# Patient Record
Sex: FEMALE | Race: WHITE | NOT HISPANIC OR LATINO | Employment: FULL TIME | URBAN - METROPOLITAN AREA
[De-identification: names, ages, dates, MRNs, and addresses within clinical notes are randomized per-mention and may not be internally consistent; named-entity substitution may affect disease eponyms.]

---

## 2018-07-03 ENCOUNTER — OFFICE VISIT (OUTPATIENT)
Dept: MEDICAL GROUP | Facility: PHYSICIAN GROUP | Age: 33
End: 2018-07-03
Payer: COMMERCIAL

## 2018-07-03 VITALS
TEMPERATURE: 97.6 F | HEIGHT: 67 IN | BODY MASS INDEX: 35.31 KG/M2 | WEIGHT: 225 LBS | HEART RATE: 91 BPM | OXYGEN SATURATION: 95 % | RESPIRATION RATE: 12 BRPM | DIASTOLIC BLOOD PRESSURE: 70 MMHG | SYSTOLIC BLOOD PRESSURE: 102 MMHG

## 2018-07-03 DIAGNOSIS — E66.9 OBESITY (BMI 30-39.9): ICD-10-CM

## 2018-07-03 DIAGNOSIS — Z91.018 CLASSIC IGE MEDIATED FOOD ALLERGY: ICD-10-CM

## 2018-07-03 DIAGNOSIS — Z79.891 CHRONIC PRESCRIPTION OPIATE USE: ICD-10-CM

## 2018-07-03 DIAGNOSIS — R63.5 WEIGHT GAIN: ICD-10-CM

## 2018-07-03 DIAGNOSIS — M21.611 BUNION OF GREAT TOE OF RIGHT FOOT: ICD-10-CM

## 2018-07-03 DIAGNOSIS — L70.9 ADULT ACNE: ICD-10-CM

## 2018-07-03 PROCEDURE — 99204 OFFICE O/P NEW MOD 45 MIN: CPT | Performed by: FAMILY MEDICINE

## 2018-07-03 RX ORDER — BUPRENORPHINE HYDROCHLORIDE AND NALOXONE HYDROCHLORIDE DIHYDRATE 8; 2 MG/1; MG/1
TABLET SUBLINGUAL
Refills: 1 | COMMUNITY
Start: 2018-06-10

## 2018-07-03 RX ORDER — ADAPALENE 0.1 G/100G
1 CREAM TOPICAL DAILY
Qty: 1 TUBE | Refills: 6 | Status: SHIPPED | OUTPATIENT
Start: 2018-07-03 | End: 2019-08-01

## 2018-07-03 ASSESSMENT — ENCOUNTER SYMPTOMS
DEPRESSION: 0
CHILLS: 0
MYALGIAS: 0
DIZZINESS: 0
PALPITATIONS: 0
PSYCHIATRIC NEGATIVE: 1
TINGLING: 0
BACK PAIN: 1
FEVER: 0
DOUBLE VISION: 0
BLURRED VISION: 0
GASTROINTESTINAL NEGATIVE: 1
COUGH: 0
HEADACHES: 0
BRUISES/BLEEDS EASILY: 0
NEUROLOGICAL NEGATIVE: 1
HEARTBURN: 0
EYES NEGATIVE: 1
NAUSEA: 0
CARDIOVASCULAR NEGATIVE: 1
RESPIRATORY NEGATIVE: 1
HEMOPTYSIS: 0

## 2018-07-03 ASSESSMENT — PATIENT HEALTH QUESTIONNAIRE - PHQ9: CLINICAL INTERPRETATION OF PHQ2 SCORE: 0

## 2018-07-03 NOTE — PROGRESS NOTES
Subjective:      Constance Mccullough is a 33 y.o. female who presents with Weight Loss (wants to loss weight ); Fatigue; Acne; and Toe Injury (feels numb)            New patient visit - issues as listed below  The weight gain and fatigue may be related to her suboxone use, she started this as a change from other opiate rx and is working with her prescriber to taper down    1. Bunion of great toe of right foot  Pain with walking and numb at times  - buprenorphine-naloxone (SUBOXONE) 8-2 MG SL Tab; DISSOVE 1 TABLET UNDER THE TONGUE TWICE DAILY SUBLINGUAL 30 DAYS; Refill: 1  - Patient identified as having weight management issue.  Appropriate orders and counseling given.  - REFERRAL TO PODIATRY  - adapalene (DIFFERIN) 0.1 % cream; Apply 1 g to affected area(s) every day.  Dispense: 1 Tube; Refill: 6  - REFERRAL TO DERMATOLOGY  - COMP METABOLIC PANEL; Future  - FREE THYROXINE; Future  - LIPID PROFILE; Future  - TRIIDOTHYRONINE; Future  - TSH; Future  - VITAMIN D,25 HYDROXY; Future  - CBC WITHOUT DIFFERENTIAL; Future    2. Weight gain    - buprenorphine-naloxone (SUBOXONE) 8-2 MG SL Tab; DISSOVE 1 TABLET UNDER THE TONGUE TWICE DAILY SUBLINGUAL 30 DAYS; Refill: 1  - Patient identified as having weight management issue.  Appropriate orders and counseling given.  - REFERRAL TO PODIATRY  - adapalene (DIFFERIN) 0.1 % cream; Apply 1 g to affected area(s) every day.  Dispense: 1 Tube; Refill: 6  - REFERRAL TO DERMATOLOGY  - COMP METABOLIC PANEL; Future  - FREE THYROXINE; Future  - LIPID PROFILE; Future  - TRIIDOTHYRONINE; Future  - TSH; Future  - VITAMIN D,25 HYDROXY; Future  - CBC WITHOUT DIFFERENTIAL; Future    3. Adult acne  For past 6 mo  - buprenorphine-naloxone (SUBOXONE) 8-2 MG SL Tab; DISSOVE 1 TABLET UNDER THE TONGUE TWICE DAILY SUBLINGUAL 30 DAYS; Refill: 1  - Patient identified as having weight management issue.  Appropriate orders and counseling given.  - REFERRAL TO PODIATRY  - adapalene (DIFFERIN) 0.1 % cream; Apply 1  g to affected area(s) every day.  Dispense: 1 Tube; Refill: 6  - REFERRAL TO DERMATOLOGY  - COMP METABOLIC PANEL; Future  - FREE THYROXINE; Future  - LIPID PROFILE; Future  - TRIIDOTHYRONINE; Future  - TSH; Future  - VITAMIN D,25 HYDROXY; Future  - CBC WITHOUT DIFFERENTIAL; Future    4. Chronic prescription opiate use    - buprenorphine-naloxone (SUBOXONE) 8-2 MG SL Tab; DISSOVE 1 TABLET UNDER THE TONGUE TWICE DAILY SUBLINGUAL 30 DAYS; Refill: 1  - Patient identified as having weight management issue.  Appropriate orders and counseling given.  - REFERRAL TO PODIATRY  - adapalene (DIFFERIN) 0.1 % cream; Apply 1 g to affected area(s) every day.  Dispense: 1 Tube; Refill: 6  - REFERRAL TO DERMATOLOGY  - COMP METABOLIC PANEL; Future  - FREE THYROXINE; Future  - LIPID PROFILE; Future  - TRIIDOTHYRONINE; Future  - TSH; Future  - VITAMIN D,25 HYDROXY; Future  - CBC WITHOUT DIFFERENTIAL; Future    5. Obesity (BMI 30-39.9)    - buprenorphine-naloxone (SUBOXONE) 8-2 MG SL Tab; DISSOVE 1 TABLET UNDER THE TONGUE TWICE DAILY SUBLINGUAL 30 DAYS; Refill: 1  - Patient identified as having weight management issue.  Appropriate orders and counseling given.  - REFERRAL TO PODIATRY  - adapalene (DIFFERIN) 0.1 % cream; Apply 1 g to affected area(s) every day.  Dispense: 1 Tube; Refill: 6  - REFERRAL TO DERMATOLOGY  - COMP METABOLIC PANEL; Future  - FREE THYROXINE; Future  - LIPID PROFILE; Future  - TRIIDOTHYRONINE; Future  - TSH; Future  - VITAMIN D,25 HYDROXY; Future  - CBC WITHOUT DIFFERENTIAL; Future    6. Classic IgE mediated food allergy  Broke out in hives and had a rash when eating a banana lately, would like to get allergy testing  - REFERRAL TO ALLERGY    Past Medical History:  No date: C section  No date: Chronic prescription opiate use  Past Surgical History:  No date: CHOLECYSTECTOMY      Comment: avelino  No date: CYST EXCISION  No date: PB C-SEC ONLY,PBEV C-SEC      Comment:  x 3  Smoking status: Never Smoker         "                                                      Smokeless tobacco: Never Used                      Alcohol use: No              Review of patient's family history indicates:    Cancer                         Mother                    Psychiatry                     Father                      Current Outpatient Prescriptions: •  adapalene (DIFFERIN) 0.1 % cream, Apply 1 g to affected area(s) every day., Disp: 1 Tube, Rfl: 6•  buprenorphine-naloxone (SUBOXONE) 8-2 MG SL Tab, DISSOVE 1 TABLET UNDER THE TONGUE TWICE DAILY SUBLINGUAL 30 DAYS, Disp: , Rfl: 1•  meclizine (ANTIVERT) 25 MG TABS, Take 1 Tab by mouth 3 times a day as needed., Disp: 20 Each, Rfl: 0    Patient was instructed on the use of medications, either prescriptions or OTC and informed on when the appropriate follow up time period should be. In addition, patient was also instructed that should any acute worsening occur that they should notify this clinic asap or call 911.            Review of Systems   Constitutional: Positive for malaise/fatigue. Negative for chills and fever.   HENT: Negative.  Negative for hearing loss.    Eyes: Negative.  Negative for blurred vision and double vision.   Respiratory: Negative.  Negative for cough and hemoptysis.    Cardiovascular: Negative.  Negative for chest pain and palpitations.   Gastrointestinal: Negative.  Negative for heartburn and nausea.   Genitourinary: Negative.  Negative for dysuria.   Musculoskeletal: Positive for back pain and joint pain. Negative for myalgias.   Skin: Positive for rash.   Neurological: Negative.  Negative for dizziness, tingling and headaches.   Endo/Heme/Allergies: Negative.  Does not bruise/bleed easily.   Psychiatric/Behavioral: Negative.  Negative for depression and suicidal ideas.   All other systems reviewed and are negative.         Objective:     /70   Pulse 91   Temp 36.4 °C (97.6 °F)   Resp 12   Ht 1.689 m (5' 6.5\")   Wt 102.1 kg (225 lb)   SpO2 95%   BMI " 35.77 kg/m²      Physical Exam   Constitutional: She is oriented to person, place, and time. She appears well-developed and well-nourished. No distress.   HENT:   Head: Normocephalic and atraumatic.   Mouth/Throat: Oropharynx is clear and moist. No oropharyngeal exudate.   Eyes: Pupils are equal, round, and reactive to light.   Cardiovascular: Normal rate, regular rhythm, normal heart sounds and intact distal pulses.  Exam reveals no gallop and no friction rub.    No murmur heard.  Pulmonary/Chest: Effort normal and breath sounds normal. No respiratory distress. She has no wheezes. She has no rales. She exhibits no tenderness.   Musculoskeletal:        Feet:    Bunion on right great toe   Neurological: She is alert and oriented to person, place, and time.   Skin: She is not diaphoretic. There is erythema.        Pustules and scabbing on face   Psychiatric: She has a normal mood and affect. Her behavior is normal. Judgment and thought content normal.   Nursing note and vitals reviewed.              Assessment/Plan:     1. Bunion of great toe of right foot    - buprenorphine-naloxone (SUBOXONE) 8-2 MG SL Tab; DISSOVE 1 TABLET UNDER THE TONGUE TWICE DAILY SUBLINGUAL 30 DAYS; Refill: 1  - Patient identified as having weight management issue.  Appropriate orders and counseling given.  - REFERRAL TO PODIATRY  - adapalene (DIFFERIN) 0.1 % cream; Apply 1 g to affected area(s) every day.  Dispense: 1 Tube; Refill: 6  - REFERRAL TO DERMATOLOGY  - COMP METABOLIC PANEL; Future  - FREE THYROXINE; Future  - LIPID PROFILE; Future  - TRIIDOTHYRONINE; Future  - TSH; Future  - VITAMIN D,25 HYDROXY; Future  - CBC WITHOUT DIFFERENTIAL; Future    2. Weight gain    - buprenorphine-naloxone (SUBOXONE) 8-2 MG SL Tab; DISSOVE 1 TABLET UNDER THE TONGUE TWICE DAILY SUBLINGUAL 30 DAYS; Refill: 1  - Patient identified as having weight management issue.  Appropriate orders and counseling given.  - REFERRAL TO PODIATRY  - adapalene (DIFFERIN) 0.1  % cream; Apply 1 g to affected area(s) every day.  Dispense: 1 Tube; Refill: 6  - REFERRAL TO DERMATOLOGY  - COMP METABOLIC PANEL; Future  - FREE THYROXINE; Future  - LIPID PROFILE; Future  - TRIIDOTHYRONINE; Future  - TSH; Future  - VITAMIN D,25 HYDROXY; Future  - CBC WITHOUT DIFFERENTIAL; Future    3. Adult acne    - buprenorphine-naloxone (SUBOXONE) 8-2 MG SL Tab; DISSOVE 1 TABLET UNDER THE TONGUE TWICE DAILY SUBLINGUAL 30 DAYS; Refill: 1  - Patient identified as having weight management issue.  Appropriate orders and counseling given.  - REFERRAL TO PODIATRY  - adapalene (DIFFERIN) 0.1 % cream; Apply 1 g to affected area(s) every day.  Dispense: 1 Tube; Refill: 6  - REFERRAL TO DERMATOLOGY  - COMP METABOLIC PANEL; Future  - FREE THYROXINE; Future  - LIPID PROFILE; Future  - TRIIDOTHYRONINE; Future  - TSH; Future  - VITAMIN D,25 HYDROXY; Future  - CBC WITHOUT DIFFERENTIAL; Future    4. Chronic prescription opiate use    - buprenorphine-naloxone (SUBOXONE) 8-2 MG SL Tab; DISSOVE 1 TABLET UNDER THE TONGUE TWICE DAILY SUBLINGUAL 30 DAYS; Refill: 1  - Patient identified as having weight management issue.  Appropriate orders and counseling given.  - REFERRAL TO PODIATRY  - adapalene (DIFFERIN) 0.1 % cream; Apply 1 g to affected area(s) every day.  Dispense: 1 Tube; Refill: 6  - REFERRAL TO DERMATOLOGY  - COMP METABOLIC PANEL; Future  - FREE THYROXINE; Future  - LIPID PROFILE; Future  - TRIIDOTHYRONINE; Future  - TSH; Future  - VITAMIN D,25 HYDROXY; Future  - CBC WITHOUT DIFFERENTIAL; Future    5. Obesity (BMI 30-39.9)    - buprenorphine-naloxone (SUBOXONE) 8-2 MG SL Tab; DISSOVE 1 TABLET UNDER THE TONGUE TWICE DAILY SUBLINGUAL 30 DAYS; Refill: 1  - Patient identified as having weight management issue.  Appropriate orders and counseling given.  - REFERRAL TO PODIATRY  - adapalene (DIFFERIN) 0.1 % cream; Apply 1 g to affected area(s) every day.  Dispense: 1 Tube; Refill: 6  - REFERRAL TO DERMATOLOGY  - COMP METABOLIC  PANEL; Future  - FREE THYROXINE; Future  - LIPID PROFILE; Future  - TRIIDOTHYRONINE; Future  - TSH; Future  - VITAMIN D,25 HYDROXY; Future  - CBC WITHOUT DIFFERENTIAL; Future    6. Classic IgE mediated food allergy    - REFERRAL TO ALLERGY

## 2018-07-03 NOTE — LETTER
[facility]  Leland Patricio M.D.  [address]  [facility] [address]  Fax: [phone]   Authorization for Release/Disclosure of   Protected Health Information   Name: CONSTANCE RIVAS : 1985 SSN: [id number]   Address: [address] [place] [id number] Phone:    [phone] (home)    I authorize the entity listed below to release/disclose the PHI below to:   [facility]/Leland Patricio M.D. and Leland Patricio M.D.   Provider or Entity Name:     Address   City, State, [facility]   Phone:      Fax:     Reason for request: continuity of care   Information to be released:    [  ] LAST COLONOSCOPY,  including any PATH REPORT and follow-up  [  ] LAST FIT/COLOGUARD RESULT [  ] LAST DEXA  [  ] LAST MAMMOGRAM  [  ] LAST PAP  [  ] LAST LABS [  ] RETINA EXAM REPORT  [  ] IMMUNIZATION RECORDS  [ X] Release all info      [  ] Check here and initial the line next to each item to release ALL health information INCLUDING  _____ Care and treatment for drug and / or alcohol abuse  _____ HIV testing, infection status, or AIDS  _____ Genetic Testing    DATES OF SERVICE OR TIME PERIOD TO BE DISCLOSED: _____________  I understand and acknowledge that:  * This Authorization may be revoked at any time by you in writing, except if your health information has already been used or disclosed.  * Your health information that will be used or disclosed as a result of you signing this authorization could be re-disclosed by the recipient. If this occurs, your re-disclosed health information may no longer be protected by State or Federal laws.  * You may refuse to sign this Authorization. Your refusal will not affect your ability to obtain treatment.  * This Authorization becomes effective upon signing and will  on (date) __________.      If no date is indicated, this Authorization will  one (1) year from the signature date.    Name: Constance Rivas    Signature:   Date:     7/3/2018       PLEASE FAX  REQUESTED RECORDS BACK TO: [phone]

## 2018-07-06 ENCOUNTER — HOSPITAL ENCOUNTER (OUTPATIENT)
Dept: LAB | Facility: MEDICAL CENTER | Age: 33
End: 2018-07-06
Attending: FAMILY MEDICINE
Payer: COMMERCIAL

## 2018-07-06 DIAGNOSIS — R63.5 WEIGHT GAIN: ICD-10-CM

## 2018-07-06 DIAGNOSIS — E66.9 OBESITY (BMI 30-39.9): ICD-10-CM

## 2018-07-06 DIAGNOSIS — Z79.891 CHRONIC PRESCRIPTION OPIATE USE: ICD-10-CM

## 2018-07-06 DIAGNOSIS — L70.9 ADULT ACNE: ICD-10-CM

## 2018-07-06 DIAGNOSIS — M21.611 BUNION OF GREAT TOE OF RIGHT FOOT: ICD-10-CM

## 2018-07-06 LAB
25(OH)D3 SERPL-MCNC: 31 NG/ML (ref 30–100)
ALBUMIN SERPL BCP-MCNC: 4.3 G/DL (ref 3.2–4.9)
ALBUMIN/GLOB SERPL: 1.5 G/DL
ALP SERPL-CCNC: 56 U/L (ref 30–99)
ALT SERPL-CCNC: 17 U/L (ref 2–50)
ANION GAP SERPL CALC-SCNC: 7 MMOL/L (ref 0–11.9)
AST SERPL-CCNC: 18 U/L (ref 12–45)
BILIRUB SERPL-MCNC: 0.6 MG/DL (ref 0.1–1.5)
BUN SERPL-MCNC: 14 MG/DL (ref 8–22)
CALCIUM SERPL-MCNC: 9.2 MG/DL (ref 8.5–10.5)
CHLORIDE SERPL-SCNC: 106 MMOL/L (ref 96–112)
CHOLEST SERPL-MCNC: 204 MG/DL (ref 100–199)
CO2 SERPL-SCNC: 26 MMOL/L (ref 20–33)
CREAT SERPL-MCNC: 0.88 MG/DL (ref 0.5–1.4)
ERYTHROCYTE [DISTWIDTH] IN BLOOD BY AUTOMATED COUNT: 39.4 FL (ref 35.9–50)
GLOBULIN SER CALC-MCNC: 2.9 G/DL (ref 1.9–3.5)
GLUCOSE SERPL-MCNC: 86 MG/DL (ref 65–99)
HCT VFR BLD AUTO: 43.2 % (ref 37–47)
HDLC SERPL-MCNC: 41 MG/DL
HGB BLD-MCNC: 14.4 G/DL (ref 12–16)
LDLC SERPL CALC-MCNC: 138 MG/DL
MCH RBC QN AUTO: 28.6 PG (ref 27–33)
MCHC RBC AUTO-ENTMCNC: 33.3 G/DL (ref 33.6–35)
MCV RBC AUTO: 85.7 FL (ref 81.4–97.8)
PLATELET # BLD AUTO: 239 K/UL (ref 164–446)
PMV BLD AUTO: 10.3 FL (ref 9–12.9)
POTASSIUM SERPL-SCNC: 3.8 MMOL/L (ref 3.6–5.5)
PROT SERPL-MCNC: 7.2 G/DL (ref 6–8.2)
RBC # BLD AUTO: 5.04 M/UL (ref 4.2–5.4)
SODIUM SERPL-SCNC: 139 MMOL/L (ref 135–145)
T3 SERPL-MCNC: 142.7 NG/DL (ref 60–181)
T4 FREE SERPL-MCNC: 0.72 NG/DL (ref 0.53–1.43)
TRIGL SERPL-MCNC: 123 MG/DL (ref 0–149)
TSH SERPL DL<=0.005 MIU/L-ACNC: 8.44 UIU/ML (ref 0.38–5.33)
WBC # BLD AUTO: 5.1 K/UL (ref 4.8–10.8)

## 2018-07-06 PROCEDURE — 84443 ASSAY THYROID STIM HORMONE: CPT

## 2018-07-06 PROCEDURE — 36415 COLL VENOUS BLD VENIPUNCTURE: CPT

## 2018-07-06 PROCEDURE — 84439 ASSAY OF FREE THYROXINE: CPT

## 2018-07-06 PROCEDURE — 80053 COMPREHEN METABOLIC PANEL: CPT

## 2018-07-06 PROCEDURE — 84480 ASSAY TRIIODOTHYRONINE (T3): CPT

## 2018-07-06 PROCEDURE — 80061 LIPID PANEL: CPT

## 2018-07-06 PROCEDURE — 85027 COMPLETE CBC AUTOMATED: CPT

## 2018-07-06 PROCEDURE — 82306 VITAMIN D 25 HYDROXY: CPT

## 2018-07-09 ENCOUNTER — TELEPHONE (OUTPATIENT)
Dept: MEDICAL GROUP | Facility: PHYSICIAN GROUP | Age: 33
End: 2018-07-09

## 2018-07-09 NOTE — TELEPHONE ENCOUNTER
----- Message from Leland Patricio M.D. sent at 7/9/2018  8:47 AM PDT -----  No obvious cause for weight gain seen in her labs

## 2018-10-10 ENCOUNTER — OFFICE VISIT (OUTPATIENT)
Dept: MEDICAL GROUP | Facility: PHYSICIAN GROUP | Age: 33
End: 2018-10-10
Payer: COMMERCIAL

## 2018-10-10 ENCOUNTER — HOSPITAL ENCOUNTER (OUTPATIENT)
Facility: MEDICAL CENTER | Age: 33
End: 2018-10-10
Attending: FAMILY MEDICINE
Payer: COMMERCIAL

## 2018-10-10 VITALS
HEART RATE: 97 BPM | TEMPERATURE: 97.8 F | WEIGHT: 226 LBS | HEIGHT: 67 IN | OXYGEN SATURATION: 97 % | BODY MASS INDEX: 35.47 KG/M2 | DIASTOLIC BLOOD PRESSURE: 84 MMHG | SYSTOLIC BLOOD PRESSURE: 128 MMHG | RESPIRATION RATE: 14 BRPM

## 2018-10-10 DIAGNOSIS — R39.9 LOWER URINARY TRACT SYMPTOMS: ICD-10-CM

## 2018-10-10 DIAGNOSIS — R30.0 DYSURIA: ICD-10-CM

## 2018-10-10 DIAGNOSIS — J02.8 ACUTE PHARYNGITIS DUE TO OTHER SPECIFIED ORGANISMS: ICD-10-CM

## 2018-10-10 DIAGNOSIS — E66.9 OBESITY (BMI 30-39.9): ICD-10-CM

## 2018-10-10 DIAGNOSIS — N30.90 CYSTITIS: ICD-10-CM

## 2018-10-10 DIAGNOSIS — E03.9 ACQUIRED HYPOTHYROIDISM: ICD-10-CM

## 2018-10-10 DIAGNOSIS — L70.8 OTHER ACNE: ICD-10-CM

## 2018-10-10 LAB
APPEARANCE UR: NORMAL
BILIRUB UR STRIP-MCNC: NEGATIVE MG/DL
COLOR UR AUTO: YELLOW
GLUCOSE UR STRIP.AUTO-MCNC: NEGATIVE MG/DL
KETONES UR STRIP.AUTO-MCNC: NEGATIVE MG/DL
LEUKOCYTE ESTERASE UR QL STRIP.AUTO: NORMAL
NITRITE UR QL STRIP.AUTO: NEGATIVE
PH UR STRIP.AUTO: 8 [PH] (ref 5–8)
PROT UR QL STRIP: 3000 MG/DL
RBC UR QL AUTO: NORMAL
SP GR UR STRIP.AUTO: 1
UROBILINOGEN UR STRIP-MCNC: NEGATIVE MG/DL

## 2018-10-10 PROCEDURE — 87086 URINE CULTURE/COLONY COUNT: CPT

## 2018-10-10 PROCEDURE — 81002 URINALYSIS NONAUTO W/O SCOPE: CPT | Performed by: FAMILY MEDICINE

## 2018-10-10 PROCEDURE — 87186 SC STD MICRODIL/AGAR DIL: CPT

## 2018-10-10 PROCEDURE — 87077 CULTURE AEROBIC IDENTIFY: CPT

## 2018-10-10 PROCEDURE — 99214 OFFICE O/P EST MOD 30 MIN: CPT | Performed by: FAMILY MEDICINE

## 2018-10-10 RX ORDER — LEVOTHYROXINE SODIUM 0.05 MG/1
50 TABLET ORAL
Qty: 90 TAB | Refills: 0 | Status: SHIPPED | OUTPATIENT
Start: 2018-10-10 | End: 2019-01-18 | Stop reason: SDUPTHER

## 2018-10-10 RX ORDER — FLUCONAZOLE 150 MG/1
TABLET ORAL
Qty: 2 TAB | Refills: 0 | Status: SHIPPED | OUTPATIENT
Start: 2018-10-10 | End: 2019-08-01

## 2018-10-10 RX ORDER — SULFAMETHOXAZOLE AND TRIMETHOPRIM 800; 160 MG/1; MG/1
1 TABLET ORAL 2 TIMES DAILY
Qty: 6 TAB | Refills: 0 | Status: SHIPPED | OUTPATIENT
Start: 2018-10-10 | End: 2018-10-13

## 2018-10-10 NOTE — ASSESSMENT & PLAN NOTE
She has had  A sore throat for about 2 weeks.  She has had mild lymph node tenderness but denies cough. She has some nasal congestion and rhinorrhea that has been green.

## 2018-10-10 NOTE — LETTER
October 10, 2018       Patient: Constance Mccullough   YOB: 1985   Date of Visit: 10/10/2018         To Whom It May Concern:    It is my medical opinion that Constance Mccullough. She may return to work on 10/11/2018.    If you have any questions or concerns, please don't hesitate to call [phone]          Sincerely,          Nadege Gomez M.D.  Electronically Signed

## 2018-10-10 NOTE — ASSESSMENT & PLAN NOTE
She has had dysuria for 3 days.  Symptoms have worsened despite increased fluid intake.  She tried azo without improvement.  She denies hematuria but has some lower abdominal discomfort.  She has not had any fevers.

## 2018-10-10 NOTE — ASSESSMENT & PLAN NOTE
She was diagnosed with cystic acne and given topical clindamycin which has helped some.  She was also prescribed an oral antibiotic.  She has had acne for about two years.  She is not on oral contraception.  She has irregular periods but denies hirsutism.

## 2018-10-10 NOTE — ASSESSMENT & PLAN NOTE
Her recent labs showed a high TSH.      Lab Results   Component Value Date/Time    TSHULTRASEN 8.440 (H) 07/06/2018 08:25 AM     She has had fatigue and weight gain in the last 6 months.

## 2018-10-10 NOTE — LETTER
October 10, 2018         Patient: Constance Mccullough   YOB: 1985   Date of Visit: 10/10/2018           To Whom it May Concern:    Constance Mccullough was seen in my clinic on 10/10/2018. She may return to work on 10/11/2018.    If you have any questions or concerns, please don't hesitate to call.        Sincerely,           Nadege Gomez M.D.  Electronically Signed

## 2018-10-13 LAB
BACTERIA UR CULT: ABNORMAL
BACTERIA UR CULT: ABNORMAL
SIGNIFICANT IND 70042: ABNORMAL
SITE SITE: ABNORMAL
SOURCE SOURCE: ABNORMAL

## 2018-10-15 ENCOUNTER — TELEPHONE (OUTPATIENT)
Dept: MEDICAL GROUP | Facility: PHYSICIAN GROUP | Age: 33
End: 2018-10-15

## 2018-10-15 NOTE — TELEPHONE ENCOUNTER
Called and LM informing patient of her urine culture results. Stated if she was still having symptoms to feel free to call back.

## 2018-10-15 NOTE — TELEPHONE ENCOUNTER
----- Message from Nadege Gomez M.D. sent at 10/15/2018 12:05 PM PDT -----  Please let her know her urine did show an infection and that the antibiotic she was given should have worked.  Thanks

## 2018-10-17 NOTE — PROGRESS NOTES
CC: sore throat, pain with urination    HISTORY OF PRESENT ILLNESS: Patient is a 33 y.o. female established patient who presents today to discuss the following    Health Maintenance: reviewed, followed in pain clinic, likely due for pap, need records    Dysuria  She has had dysuria for 3 days.  Symptoms have worsened despite increased fluid intake.  She tried azo without improvement.  She denies hematuria but has some lower abdominal discomfort.  She has not had any fevers.    Acute pharyngitis due to other specified organisms  She has had  A sore throat for about 2 weeks.  She has had mild lymph node tenderness but denies cough. She has some nasal congestion and rhinorrhea that has been green.      Other acne  She was diagnosed with cystic acne and given topical clindamycin which has helped some.  She was also prescribed an oral antibiotic.  She has had acne for about two years.  She is not on oral contraception.  She has irregular periods but denies hirsutism.      Acquired hypothyroidism  Her recent labs showed a high TSH.      Lab Results   Component Value Date/Time    TSHULTRASEN 8.440 (H) 07/06/2018 08:25 AM     She has had fatigue and weight gain in the last 6 months.      Obesity (BMI 30-39.9)  She has gained 50 lb in the last 6 months despite no changes in her activity or diet.      Patient Active Problem List    Diagnosis Date Noted   • Dysuria 10/10/2018   • Acute pharyngitis due to other specified organisms 10/10/2018   • Other acne 10/10/2018   • Acquired hypothyroidism 10/10/2018   • Obesity (BMI 30-39.9) 07/03/2018   • Chronic prescription opiate use 07/03/2018      Allergies:Patient has no known allergies.    Current Outpatient Prescriptions   Medication Sig Dispense Refill   • levothyroxine (SYNTHROID) 50 MCG Tab Take 1 Tab by mouth Every morning on an empty stomach. 90 Tab 0   • fluconazole (DIFLUCAN) 150 MG tablet Take one tab, repeat after 3-4 days 2 Tab 0   • buprenorphine-naloxone (SUBOXONE)  "8-2 MG SL Tab DISSOVE 1 TABLET UNDER THE TONGUE TWICE DAILY SUBLINGUAL 30 DAYS  1   • adapalene (DIFFERIN) 0.1 % cream Apply 1 g to affected area(s) every day. 1 Tube 6   • meclizine (ANTIVERT) 25 MG TABS Take 1 Tab by mouth 3 times a day as needed. 20 Each 0     No current facility-administered medications for this visit.        Social History   Substance Use Topics   • Smoking status: Never Smoker   • Smokeless tobacco: Never Used   • Alcohol use No     Social History     Social History Narrative   • No narrative on file       Family History   Problem Relation Age of Onset   • Cancer Mother    • Psychiatry Father        Review of Systems:      - Constitutional: Negative for fever, chills, unexpected weight change, and fatigue/generalized weakness.     - HEENT: Negative for headaches, vision changes, hearing changes, ear pain, ear discharge, positive for sore throat and neck pain.      - Respiratory: Negative for cough, sputum production, chest congestion, dyspnea, wheezing, and crackles.      - Cardiovascular: Negative for chest pain, palpitations, orthopnea, and bilateral lower extremity edema.     - Gastrointestinal: Negative for heartburn, nausea, vomiting, abdominal pain, hematochezia, melena, diarrhea, constipation, and greasy/foul-smelling stools.     - Musculoskeletal: Negative for myalgias, back pain, and joint pain.       Exam:    Blood pressure 128/84, pulse 97, temperature 36.6 °C (97.8 °F), resp. rate 14, height 1.689 m (5' 6.5\"), weight 102.5 kg (226 lb), SpO2 97 %. Body mass index is 35.93 kg/m².    General:  Well nourished, well developed female in NAD  Head is grossly normal.  Physical Exam  General: well appearing  Head: atraumatic, normocephalic  Eyes: normal lids and conjunctiva, pupils equally reactive and responsive to light and accomodation  Ears: normal external ears, normal auditory canal and TM bilaterally  Nose: mild mucosal edema, clear rhinorrhea, sinuses not tender to palpation "   Mouth: normal oral mucosa, uvula midline, tonsils not enlarge without erythema and exudate  Neck: supple, no nuchal rigidity, no palpable submandibular or cervical lymph nodes but slight tenderness in the anterior cervical chain is present  Heart: Rate and rhythm are normal, no extra sounds, no pedal edema  Lungs: Normal effort and rate, lungs clear to auscultation and percussion bilaterally  Abdomen: no CVA tenderness      Please note that this dictation was created using voice recognition software. I have made every reasonable attempt to correct obvious errors, but I expect that there are errors of grammar and possibly content that I did not discover before finalizing the note.    Assessment/Plan:  1. Lower urinary tract symptoms  Likely UTI given symptoms.  Will treat with preferred antibiotics and order culture to confirm sensitivity.  Diflucan given at her request as she often gets yeast infections after antibiotics.  No contraindications to diflucan.  - POCT Urinalysis  - fluconazole (DIFLUCAN) 150 MG tablet; Take one tab, repeat after 3-4 days  Dispense: 2 Tab; Refill: 0    2. Dysuria    3. Acute pharyngitis due to other specified organisms  This is likely viral, centor criteria is 2/5 and strep testing not indicated.  She is reassured and will treat symptomatically.  For worsening symptoms or onset of fevers she was advised to follow up.    4. Other acne  Continue care with dermatology.  Discussed possible medications and the need for oral medications in cystic acne.    5. Acquired hypothyroidism  Will start levothyroxine given consistent symptoms and an elevated TSH and lower normal thyroid hormones.  Discussed side effects of medication or signs of over treatment such as palpitations, diarrhea and sweating.  We will repeat labs in 2 months and follow up at that time.  - levothyroxine (SYNTHROID) 50 MCG Tab; Take 1 Tab by mouth Every morning on an empty stomach.  Dispense: 90 Tab; Refill: 0  - TSH;  Future  - FREE THYROXINE; Future  - TRIIDOTHYRONINE; Future    6. Obesity (BMI 30-39.9)  Discussed management options and dietary and lifestyle goals.    7. Cystitis  No evidence for pyelo, will treat with bactrim.  - URINE CULTURE(NEW); Future  - sulfamethoxazole-trimethoprim (BACTRIM DS) 800-160 MG tablet; Take 1 Tab by mouth 2 times a day for 3 days.  Dispense: 6 Tab; Refill: 0

## 2019-05-14 ENCOUNTER — OFFICE VISIT (OUTPATIENT)
Dept: URGENT CARE | Facility: CLINIC | Age: 34
End: 2019-05-14
Payer: COMMERCIAL

## 2019-05-14 VITALS
WEIGHT: 226 LBS | OXYGEN SATURATION: 95 % | SYSTOLIC BLOOD PRESSURE: 120 MMHG | TEMPERATURE: 98.2 F | RESPIRATION RATE: 14 BRPM | BODY MASS INDEX: 35.47 KG/M2 | HEIGHT: 67 IN | HEART RATE: 86 BPM | DIASTOLIC BLOOD PRESSURE: 74 MMHG

## 2019-05-14 DIAGNOSIS — J02.9 PHARYNGITIS, UNSPECIFIED ETIOLOGY: ICD-10-CM

## 2019-05-14 PROCEDURE — 99214 OFFICE O/P EST MOD 30 MIN: CPT | Performed by: PHYSICIAN ASSISTANT

## 2019-05-14 RX ORDER — AMOXICILLIN 500 MG/1
500 CAPSULE ORAL 2 TIMES DAILY
Qty: 20 CAP | Refills: 0 | Status: SHIPPED | OUTPATIENT
Start: 2019-05-14 | End: 2019-05-24

## 2019-05-14 RX ORDER — OMEGA-3 FATTY ACIDS/FISH OIL 300-1000MG
CAPSULE ORAL
COMMUNITY
End: 2019-08-01

## 2019-05-14 ASSESSMENT — ENCOUNTER SYMPTOMS
SORE THROAT: 1
FOCAL WEAKNESS: 0
PALPITATIONS: 0
ABDOMINAL PAIN: 0
CHILLS: 0
COUGH: 0
FEVER: 0
SHORTNESS OF BREATH: 0
TINGLING: 0
SWOLLEN GLANDS: 1
STRIDOR: 0
HEADACHES: 0
DIARRHEA: 1
HOARSE VOICE: 0
VOMITING: 1
SENSORY CHANGE: 0
MYALGIAS: 1

## 2019-05-14 NOTE — LETTER
May 14, 2019         Patient: Constance Mccullough   YOB: 1985   Date of Visit: 5/14/2019           To Whom it May Concern:    Constance Mccullough was seen in my clinic on 5/14/2019. She is excused from work 5/13/19-5/15/19 due to medical illness.    If you have any questions or concerns, please don't hesitate to call.        Sincerely,           Gabby Gurrola P.A.-C.  Electronically Signed

## 2019-05-14 NOTE — PROGRESS NOTES
Subjective:      Constance Mccullough is a 34 y.o. female who presents with Other (vomiting, fatigue, headache, body feels sore )            Pharyngitis    This is a new problem. The current episode started yesterday. The problem has been unchanged. Maximum temperature: unknown. Associated symptoms include diarrhea, swollen glands and vomiting (once). Pertinent negatives include no abdominal pain, congestion, coughing, ear discharge, ear pain, headaches, hoarse voice, plugged ear sensation, shortness of breath or stridor. Exposure to: Daughter has similar symptoms. She has tried NSAIDs for the symptoms. The treatment provided mild relief.     The patient denies any recent travel, recent hospitalization, or recent antibiotics. Denies suspect food intake. 1 episode of diarrhea.     Past Medical History:   Diagnosis Date   • C section    • Chronic prescription opiate use        Past Surgical History:   Procedure Laterality Date   • CHOLECYSTECTOMY      avelino   • CYST EXCISION     • PB C-SEC ONLY,PBEV C-SEC       x 3       Family History   Problem Relation Age of Onset   • Cancer Mother    • Psychiatry Father        No Known Allergies    Medications, Allergies, and current problem list reviewed today in Epic    Review of Systems   Constitutional: Negative for chills, fever and malaise/fatigue.   HENT: Positive for sore throat. Negative for congestion, ear discharge, ear pain and hoarse voice.    Respiratory: Negative for cough, shortness of breath and stridor.    Cardiovascular: Negative for chest pain, palpitations and leg swelling.   Gastrointestinal: Positive for diarrhea and vomiting (once). Negative for abdominal pain.   Musculoskeletal: Positive for myalgias.   Skin: Negative for rash.   Neurological: Negative for tingling, sensory change, focal weakness and headaches.     All other systems reviewed and are negative.        Objective:     /74   Pulse 86   Temp 36.8 °C (98.2 °F)   Resp 14   Ht 1.689 m  "(5' 6.5\")   Wt 102.5 kg (226 lb)   SpO2 95%   BMI 35.93 kg/m²      Physical Exam   Constitutional: She is oriented to person, place, and time. She appears well-developed and well-nourished. No distress.   HENT:   Head: Normocephalic and atraumatic.   Right Ear: Tympanic membrane, external ear and ear canal normal.   Left Ear: Tympanic membrane, external ear and ear canal normal.   Nose: Nose normal.   Mouth/Throat: Uvula is midline and mucous membranes are normal. Posterior oropharyngeal erythema present. Tonsils are 1+ on the right. Tonsils are 1+ on the left. No tonsillar exudate.   Eyes: Conjunctivae are normal.   Neck: Neck supple.   Cardiovascular: Normal rate, regular rhythm and normal heart sounds.  Exam reveals no gallop and no friction rub.    No murmur heard.  Pulmonary/Chest: Effort normal and breath sounds normal. No respiratory distress. She has no wheezes. She has no rales.   Lymphadenopathy:     She has cervical adenopathy (mild cervical adenopathy ).   Neurological: She is alert and oriented to person, place, and time. No cranial nerve deficit.   Skin: Skin is warm and dry. No rash noted.   Psychiatric: She has a normal mood and affect. Her behavior is normal. Judgment and thought content normal.               Assessment/Plan:     1. Pharyngitis, unspecified etiology    - amoxicillin (AMOXIL) 500 MG Cap; Take 1 Cap by mouth 2 times a day for 10 days.  Dispense: 20 Cap; Refill: 0    Unfortunately, we do not have POC testing at this site yet. Daughter is with mother who demonstrates signs of strep- will treat.     Differential diagnoses, Supportive care, and indications for immediate follow-up discussed with patient.   Instructed to return to clinic or nearest emergency department for any change in condition, further concerns, or worsening of symptoms.    The patient demonstrated a good understanding and agreed with the treatment plan.    Gabby Gurrola P.A.-C.      "

## 2019-08-01 ENCOUNTER — OFFICE VISIT (OUTPATIENT)
Dept: URGENT CARE | Facility: CLINIC | Age: 34
End: 2019-08-01
Payer: COMMERCIAL

## 2019-08-01 VITALS
WEIGHT: 201 LBS | HEIGHT: 66 IN | RESPIRATION RATE: 16 BRPM | DIASTOLIC BLOOD PRESSURE: 62 MMHG | TEMPERATURE: 97.2 F | OXYGEN SATURATION: 94 % | HEART RATE: 83 BPM | BODY MASS INDEX: 32.3 KG/M2 | SYSTOLIC BLOOD PRESSURE: 104 MMHG

## 2019-08-01 DIAGNOSIS — H00.011 HORDEOLUM EXTERNUM OF RIGHT UPPER EYELID: ICD-10-CM

## 2019-08-01 PROCEDURE — 99214 OFFICE O/P EST MOD 30 MIN: CPT | Performed by: FAMILY MEDICINE

## 2019-08-01 RX ORDER — LEVOTHYROXINE SODIUM 0.05 MG/1
50 TABLET ORAL
COMMUNITY
End: 2021-02-19

## 2019-08-01 RX ORDER — POLYMYXIN B SULFATE AND TRIMETHOPRIM 1; 10000 MG/ML; [USP'U]/ML
1 SOLUTION OPHTHALMIC EVERY 4 HOURS
Qty: 10 ML | Refills: 0 | Status: SHIPPED | OUTPATIENT
Start: 2019-08-01 | End: 2019-08-13

## 2019-08-01 ASSESSMENT — ENCOUNTER SYMPTOMS
BLURRED VISION: 0
EYE REDNESS: 0
DOUBLE VISION: 0
EYE DISCHARGE: 0
EYE PAIN: 0
PHOTOPHOBIA: 0

## 2019-08-01 NOTE — LETTER
August 1, 2019         Patient: Constance Mccullough   YOB: 1985   Date of Visit: 8/1/2019           To Whom it May Concern:    Constance Mccullough was seen in my clinic on 8/1/2019. She may return to work on 08/02/2019..    If you have any questions or concerns, please don't hesitate to call.        Sincerely,           Dex Frey M.D.  Electronically Signed

## 2019-08-01 NOTE — PATIENT INSTRUCTIONS
Continue warm compresses   Topical antibiotic as directed   follow up if not significantly improved as expected in 2-3 days, sooner if any worsening or new symptoms      Stye  A stye is a bump on your eyelid caused by a bacterial infection. A stye can form inside the eyelid (internal stye) or outside the eyelid (external stye). An internal stye may be caused by an infected oil-producing gland inside your eyelid. An external stye may be caused by an infection at the base of your eyelash (hair follicle).  Styes are very common. Anyone can get them at any age. They usually occur in just one eye, but you may have more than one in either eye.  What are the causes?  The infection is almost always caused by bacteria called Staphylococcus aureus. This is a common type of bacteria that lives on your skin.  What increases the risk?  You may be at higher risk for a stye if you have had one before. You may also be at higher risk if you have:  · Diabetes.  · Long-term illness.  · Long-term eye redness.  · A skin condition called seborrhea.  · High fat levels in your blood (lipids).  What are the signs or symptoms?  Eyelid pain is the most common symptom of a stye. Internal styes are more painful than external styes. Other signs and symptoms may include:  · Painful swelling of your eyelid.  · A scratchy feeling in your eye.  · Tearing and redness of your eye.  · Pus draining from the stye.  How is this diagnosed?  Your health care provider may be able to diagnose a stye just by examining your eye. The health care provider may also check to make sure:  · You do not have a fever or other signs of a more serious infection.  · The infection has not spread to other parts of your eye or areas around your eye.  How is this treated?  Most styes will clear up in a few days without treatment. In some cases, you may need to use antibiotic drops or ointment to prevent infection. Your health care provider may have to drain the stye surgically  if your stye is:  · Large.  · Causing a lot of pain.  · Interfering with your vision.  This can be done using a thin blade or a needle.  Follow these instructions at home:  · Take medicines only as directed by your health care provider.  · Apply a clean, warm compress to your eye for 10 minutes, 4 times a day.  · Do not wear contact lenses or eye makeup until your stye has healed.  · Do not try to pop or drain the stye.  Contact a health care provider if:  · You have chills or a fever.  · Your stye does not go away after several days.  · Your stye affects your vision.  · Your eyeball becomes swollen, red, or painful.  This information is not intended to replace advice given to you by your health care provider. Make sure you discuss any questions you have with your health care provider.  Document Released: 09/27/2006 Document Revised: 08/13/2017 Document Reviewed: 04/03/2015  Elsevier Interactive Patient Education © 2017 [employer]

## 2019-08-02 NOTE — PROGRESS NOTES
"Subjective:      Constance Mccullough is a 34 y.o. female who presents with Eye Problem (both irritated eyes, swollen on the right and watery on the left started yesterday)            This is a new problem.  34-year-old female presenting for evaluation of swelling she noticed in the medial aspect of the right upper eyelid this morning and some irritation on the right eye which is not slightly better.  Denies any injury.  Denies any change in vision, other URI symptoms.  She does not wear any contact lenses.      Review of Systems   Eyes: Negative for blurred vision, double vision, photophobia, pain, discharge and redness.   All other systems reviewed and are negative.         Objective:     /62   Pulse 83   Temp 36.2 °C (97.2 °F)   Resp 16   Ht 1.676 m (5' 6\")   Wt 91.2 kg (201 lb)   SpO2 94%   BMI 32.44 kg/m²      Physical Exam   Constitutional: She is oriented to person, place, and time. She appears well-developed and well-nourished.  Non-toxic appearance. No distress.   HENT:   Head: Normocephalic and atraumatic.   Right Ear: External ear normal.   Left Ear: External ear normal.   Nose: Nose normal.   Eyes: Pupils are equal, round, and reactive to light. Conjunctivae and EOM are normal. Right eye exhibits no chemosis, no discharge and no exudate. No foreign body present in the right eye. Left eye exhibits no chemosis, no discharge and no exudate. No foreign body present in the left eye. Right conjunctiva is not injected. Left conjunctiva is not injected. No scleral icterus.       Small erythema noted on the outline area suggestive of a early stye.  No drainage or fluctuation noted   Cardiovascular: Normal rate.   Pulmonary/Chest: Effort normal. No respiratory distress.   Neurological: She is oriented to person, place, and time.   Skin: Skin is warm. No rash noted. She is not diaphoretic. No erythema. No pallor.   Psychiatric: She has a normal mood and affect.               Assessment/Plan:     1. Hordeolum " externum of right upper eyelid  - polymixin-trimethoprim (POLYTRIM) 33185-5.1 UNIT/ML-% Solution; Place 1 Drop in right eye every 4 hours.  Dispense: 10 mL; Refill: 0    We discussed warm compresses  Plan per orders and instructions  Warning signs reviewed

## 2019-08-13 ENCOUNTER — OFFICE VISIT (OUTPATIENT)
Dept: URGENT CARE | Facility: CLINIC | Age: 34
End: 2019-08-13
Payer: COMMERCIAL

## 2019-08-13 ENCOUNTER — APPOINTMENT (OUTPATIENT)
Dept: RADIOLOGY | Facility: IMAGING CENTER | Age: 34
End: 2019-08-13
Attending: FAMILY MEDICINE
Payer: COMMERCIAL

## 2019-08-13 VITALS
SYSTOLIC BLOOD PRESSURE: 106 MMHG | WEIGHT: 222 LBS | BODY MASS INDEX: 35.68 KG/M2 | HEART RATE: 90 BPM | HEIGHT: 66 IN | RESPIRATION RATE: 16 BRPM | TEMPERATURE: 97.4 F | OXYGEN SATURATION: 95 % | DIASTOLIC BLOOD PRESSURE: 72 MMHG

## 2019-08-13 DIAGNOSIS — S49.92XA INJURY OF LEFT SHOULDER, INITIAL ENCOUNTER: ICD-10-CM

## 2019-08-13 PROCEDURE — 73030 X-RAY EXAM OF SHOULDER: CPT | Mod: TC,LT | Performed by: FAMILY MEDICINE

## 2019-08-13 PROCEDURE — 99214 OFFICE O/P EST MOD 30 MIN: CPT | Performed by: FAMILY MEDICINE

## 2019-08-13 NOTE — PROGRESS NOTES
"Subjective:      Constance Mccullough is a 34 y.o. female who presents with Arm Pain (left arm)            This is a new problem.  34-year-old presenting for evaluation of left shoulder.  She states that she fell 2 days ago and she braced herself with her left forearm and since having some pain in the shoulder.  She does have some decreased range of motion in the shoulder.  She denies any paresthesias or muscle weakness.  She denies any other injury.  No previous injury of shoulder fracture reported.  She has been taking OTC NSAIDs      Review of Systems   All other systems reviewed and are negative.         Objective:     /72 (BP Location: Right arm, Patient Position: Sitting)   Pulse 90   Temp 36.3 °C (97.4 °F)   Resp 16   Ht 1.676 m (5' 6\")   Wt 100.7 kg (222 lb)   SpO2 95%   BMI 35.83 kg/m²      Physical Exam   Constitutional: She is oriented to person, place, and time. She appears well-developed and well-nourished.  Non-toxic appearance. No distress.   HENT:   Head: Normocephalic and atraumatic.   Right Ear: External ear normal.   Left Ear: External ear normal.   Nose: Nose normal.   Eyes: Conjunctivae are normal.   Neck: Normal range of motion. No spinous process tenderness and no muscular tenderness present.   Cardiovascular: Normal rate.   Musculoskeletal:        Right shoulder: Normal.        Left shoulder: She exhibits decreased range of motion and tenderness (Over the outline area). She exhibits no swelling, no effusion, no crepitus, no deformity, no laceration, no spasm, normal pulse and normal strength.        Left elbow: Normal. She exhibits normal range of motion, no swelling and no effusion.        Left wrist: Normal. She exhibits normal range of motion, no tenderness and no bony tenderness.        Cervical back: She exhibits normal range of motion, no tenderness, no bony tenderness, no swelling, no edema, no deformity, no laceration and normal pulse.        Arms:       Left hand: Normal.   "   Neurovascular intact in both upper extremities.   Neurological: She is alert and oriented to person, place, and time.   Skin: Skin is warm. No rash noted. She is not diaphoretic. No erythema. No pallor.   Psychiatric: She has a normal mood and affect.         X-ray of the left shoulder does not show any acute abnormalities on my read       Assessment/Plan:     1. Injury of left shoulder, initial encounter  - DX-SHOULDER 2+ LEFT; Future  - Slings      Likely sprain.  We discussed OTC NSAIDs.  Sling for comfort discussed.  We also discussed and encourage range of motion at home and showed her how to do it.  Icing frequently  Follow-up on next Monday for repeat evaluation, sooner if any worsening or new symptoms.  Plan per orders and instructions  Warning signs reviewed  Work/School Excuse given

## 2019-08-13 NOTE — LETTER
August 13, 2019         Patient: Constance Mccullough   YOB: 1985   Date of Visit: 8/13/2019           To Whom it May Concern:    Constance Mccullough was seen in my clinic on 8/13/2019. She may return to work on 08/14/2019.    If you have any questions or concerns, please don't hesitate to call.        Sincerely,           Dex Frey M.D.  Electronically Signed

## 2019-08-13 NOTE — PATIENT INSTRUCTIONS
Sling for comfort  Over-the-counter ibuprofen or Aleve as needed for pain  Icing frequently  Follow-up next Monday for repeat check, sooner if any worsening or new symptoms.

## 2019-11-11 ENCOUNTER — OFFICE VISIT (OUTPATIENT)
Dept: URGENT CARE | Facility: CLINIC | Age: 34
End: 2019-11-11
Payer: COMMERCIAL

## 2019-11-11 VITALS
HEART RATE: 94 BPM | SYSTOLIC BLOOD PRESSURE: 118 MMHG | OXYGEN SATURATION: 96 % | RESPIRATION RATE: 16 BRPM | WEIGHT: 205 LBS | BODY MASS INDEX: 32.95 KG/M2 | DIASTOLIC BLOOD PRESSURE: 86 MMHG | HEIGHT: 66 IN | TEMPERATURE: 97.4 F

## 2019-11-11 DIAGNOSIS — K08.89 TOOTH PAIN: ICD-10-CM

## 2019-11-11 PROCEDURE — 99214 OFFICE O/P EST MOD 30 MIN: CPT | Performed by: FAMILY MEDICINE

## 2019-11-11 RX ORDER — CHLORHEXIDINE GLUCONATE ORAL RINSE 1.2 MG/ML
15 SOLUTION DENTAL 2 TIMES DAILY
Qty: 1 BOTTLE | Refills: 0 | Status: SHIPPED | OUTPATIENT
Start: 2019-11-11 | End: 2019-11-21

## 2019-11-11 RX ORDER — PENICILLIN V POTASSIUM 500 MG/1
500 TABLET ORAL 3 TIMES DAILY
Qty: 30 TAB | Refills: 0 | Status: SHIPPED | OUTPATIENT
Start: 2019-11-11 | End: 2019-11-21

## 2019-11-12 NOTE — PROGRESS NOTES
"Subjective:      Constance Mccullough is a 34 y.o. female who presents with Oral Pain (possible infection)            This is a new problem.  34-year-old presenting with tooth pain for the past few days, no fever or chills reported.  She has a follow-up appoint with her dentist in 2 weeks.      Review of Systems   All other systems reviewed and are negative.         Objective:     /86 (BP Location: Left arm, Patient Position: Sitting)   Pulse 94   Temp 36.3 °C (97.4 °F)   Resp 16   Ht 1.676 m (5' 6\")   Wt 93 kg (205 lb)   SpO2 96%   BMI 33.09 kg/m²      Physical Exam  Constitutional:       General: She is not in acute distress.     Appearance: She is not ill-appearing, toxic-appearing or diaphoretic.   HENT:      Head: Normocephalic and atraumatic.      Right Ear: External ear normal.      Left Ear: External ear normal.      Mouth/Throat:      Mouth: Mucous membranes are moist.      Dentition: Abnormal dentition (Some missing teeth). Dental tenderness and dental caries present. No gingival swelling.      Pharynx: Uvula midline. No pharyngeal swelling, oropharyngeal exudate, posterior oropharyngeal erythema or uvula swelling.      Tonsils: No tonsillar abscesses.     Eyes:      Conjunctiva/sclera: Conjunctivae normal.   Neck:      Musculoskeletal: Neck supple. No muscular tenderness.   Cardiovascular:      Rate and Rhythm: Normal rate and regular rhythm.   Pulmonary:      Effort: Pulmonary effort is normal. No respiratory distress.      Breath sounds: No stridor.   Lymphadenopathy:      Cervical: No cervical adenopathy.   Skin:     General: Skin is warm.      Coloration: Skin is not jaundiced or pale.      Findings: No bruising, erythema or rash.   Neurological:      Mental Status: She is alert and oriented to person, place, and time.   Psychiatric:         Mood and Affect: Mood normal.             Assessment/Plan:       1. Tooth pain  - penicillin v potassium (VEETID) 500 MG Tab; Take 1 Tab by mouth 3 times a " day for 10 days.  Dispense: 30 Tab; Refill: 0  - chlorhexidine (PERIDEX) 0.12 % Solution; Take 15 mL by mouth 2 times a day for 10 days.  Dispense: 1 Bottle; Refill: 0      Continue symptomatic care  Plan per orders and instructions  Warning signs reviewed

## 2020-09-21 ENCOUNTER — HOSPITAL ENCOUNTER (OUTPATIENT)
Facility: MEDICAL CENTER | Age: 35
End: 2020-09-21
Attending: PHYSICIAN ASSISTANT
Payer: COMMERCIAL

## 2020-09-21 ENCOUNTER — OFFICE VISIT (OUTPATIENT)
Dept: URGENT CARE | Facility: CLINIC | Age: 35
End: 2020-09-21
Payer: COMMERCIAL

## 2020-09-21 VITALS
SYSTOLIC BLOOD PRESSURE: 116 MMHG | HEIGHT: 66 IN | OXYGEN SATURATION: 99 % | DIASTOLIC BLOOD PRESSURE: 80 MMHG | TEMPERATURE: 97.9 F | BODY MASS INDEX: 35.69 KG/M2 | WEIGHT: 222.1 LBS | RESPIRATION RATE: 16 BRPM | HEART RATE: 95 BPM

## 2020-09-21 DIAGNOSIS — J02.9 PHARYNGITIS, UNSPECIFIED ETIOLOGY: ICD-10-CM

## 2020-09-21 DIAGNOSIS — R68.89 FLU-LIKE SYMPTOMS: ICD-10-CM

## 2020-09-21 LAB
INT CON NEG: NORMAL
INT CON POS: NORMAL
S PYO AG THROAT QL: NEGATIVE

## 2020-09-21 PROCEDURE — 99214 OFFICE O/P EST MOD 30 MIN: CPT | Performed by: PHYSICIAN ASSISTANT

## 2020-09-21 PROCEDURE — U0003 INFECTIOUS AGENT DETECTION BY NUCLEIC ACID (DNA OR RNA); SEVERE ACUTE RESPIRATORY SYNDROME CORONAVIRUS 2 (SARS-COV-2) (CORONAVIRUS DISEASE [COVID-19]), AMPLIFIED PROBE TECHNIQUE, MAKING USE OF HIGH THROUGHPUT TECHNOLOGIES AS DESCRIBED BY CMS-2020-01-R: HCPCS

## 2020-09-21 PROCEDURE — 87880 STREP A ASSAY W/OPTIC: CPT | Performed by: PHYSICIAN ASSISTANT

## 2020-09-21 ASSESSMENT — ENCOUNTER SYMPTOMS
COUGH: 1
SPUTUM PRODUCTION: 1
SORE THROAT: 1
SINUS PAIN: 0
FEVER: 1
BODY ACHES: 1
CHILLS: 1

## 2020-09-21 NOTE — PROGRESS NOTES
Subjective:   Constance Mccullough is a 35 y.o. female who presents today with   Chief Complaint   Patient presents with   • Coronavirus Screening     x thursday; fatigue, chills, fever, gen body aches, sore throat, headache       Generalized Body Aches  This is a new problem. The current episode started in the past 7 days. The problem occurs intermittently. The problem has been unchanged. Associated symptoms include chills, congestion, coughing, a fever and a sore throat. Nothing aggravates the symptoms. She has tried NSAIDs for the symptoms. The treatment provided mild relief.   I personally donned proper PPE throughout visit today.   Patient states a couple co-workers have been + for COVID she believes.  PMH:  has a past medical history of C section and Chronic prescription opiate use. She also has no past medical history of Diabetes (HCC), Hyperlipidemia, or Hypertension.  MEDS:   Current Outpatient Medications:   •  IBUPROFEN PO, Take  by mouth., Disp: , Rfl:   •  buprenorphine-naloxone (SUBOXONE) 8-2 MG SL Tab, DISSOVE 1 TABLET UNDER THE TONGUE TWICE DAILY SUBLINGUAL 30 DAYS, Disp: , Rfl: 1  •  levothyroxine (SYNTHROID) 50 MCG Tab, Take 50 mcg by mouth Every morning on an empty stomach., Disp: , Rfl:   ALLERGIES: No Known Allergies  SURGHX:   Past Surgical History:   Procedure Laterality Date   • CHOLECYSTECTOMY      avelino   • CYST EXCISION     • PB C-SEC ONLY,PBEV C-SEC       x 3     SOCHX:  reports that she has never smoked. She has never used smokeless tobacco. She reports that she does not drink alcohol or use drugs.  FH: Reviewed with patient, not pertinent to this visit.       Review of Systems   Constitutional: Positive for chills and fever.   HENT: Positive for congestion and sore throat. Negative for ear pain and sinus pain.    Respiratory: Positive for cough and sputum production.    All other systems reviewed and are negative.       Objective:   /80 (BP Location: Right arm, Patient  "Position: Sitting)   Pulse 95   Temp 36.6 °C (97.9 °F) (Temporal)   Resp 16   Ht 1.676 m (5' 6\")   Wt 100.7 kg (222 lb 1.6 oz)   SpO2 99%   BMI 35.85 kg/m²   Physical Exam  Vitals signs and nursing note reviewed.   Constitutional:       General: She is not in acute distress.     Appearance: Normal appearance. She is well-developed. She is not ill-appearing or toxic-appearing.   HENT:      Head: Normocephalic and atraumatic.      Right Ear: Hearing normal.      Left Ear: Hearing normal.      Mouth/Throat:      Pharynx: Posterior oropharyngeal erythema present. No uvula swelling.      Tonsils: Tonsillar exudate present. No tonsillar abscesses. 1+ on the right. 1+ on the left.   Eyes:      Pupils: Pupils are equal, round, and reactive to light.   Cardiovascular:      Rate and Rhythm: Normal rate and regular rhythm.      Heart sounds: Normal heart sounds.   Pulmonary:      Effort: Pulmonary effort is normal.      Breath sounds: Normal breath sounds.   Musculoskeletal:      Comments: Normal movement in all 4 extremities   Skin:     General: Skin is warm and dry.   Neurological:      Mental Status: She is alert.      Coordination: Coordination normal.   Psychiatric:         Mood and Affect: Mood normal.       STREP A NEG    Assessment/Plan:   Assessment    1. Pharyngitis, unspecified etiology  - POCT Rapid Strep A    2. Flu-like symptoms  - COVID/SARS COV-2 PCR; Future    Other orders  - IBUPROFEN PO; Take  by mouth.  Discussed CDC guidelines including self isolation at home. Offered Decadron but patient declines.  Patient encouraged to get plenty of rest, use OTC tylenol for pain/fever, and drink plenty of fluids.  Differential diagnosis, natural history, supportive care, and indications for immediate follow-up discussed.   Patient given instructions and understanding of medications and treatment.    If not improving in 3-5 days, F/U with PCP or return to  if symptoms worsen.    Patient agreeable to " plan.      Please note that this dictation was created using voice recognition software. I have made every reasonable attempt to correct obvious errors, but I expect that there are errors of grammar and possibly content that I did not discover before finalizing the note.    Ronald Rojo PA-C

## 2020-09-22 ENCOUNTER — TELEPHONE (OUTPATIENT)
Dept: URGENT CARE | Facility: PHYSICIAN GROUP | Age: 35
End: 2020-09-22

## 2020-09-22 DIAGNOSIS — R68.89 FLU-LIKE SYMPTOMS: ICD-10-CM

## 2020-09-22 LAB
COVID ORDER STATUS COVID19: NORMAL
SARS-COV-2 RNA RESP QL NAA+PROBE: NOTDETECTED
SPECIMEN SOURCE: NORMAL

## 2020-09-22 NOTE — TELEPHONE ENCOUNTER
Called patient and discussed negative COVID test results.  Encouraged her to continue following CDC guidelines especially since she still has a fever.  Discussed with her that she should be at least 3 days without fever and not taking Tylenol or ibuprofen to suppress the fever.

## 2020-12-07 ENCOUNTER — HOSPITAL ENCOUNTER (OUTPATIENT)
Facility: MEDICAL CENTER | Age: 35
End: 2020-12-07
Attending: PHYSICIAN ASSISTANT
Payer: COMMERCIAL

## 2020-12-07 ENCOUNTER — OFFICE VISIT (OUTPATIENT)
Dept: URGENT CARE | Facility: CLINIC | Age: 35
End: 2020-12-07
Payer: COMMERCIAL

## 2020-12-07 VITALS
SYSTOLIC BLOOD PRESSURE: 118 MMHG | OXYGEN SATURATION: 95 % | TEMPERATURE: 97.9 F | HEART RATE: 68 BPM | BODY MASS INDEX: 34.55 KG/M2 | WEIGHT: 215 LBS | RESPIRATION RATE: 16 BRPM | HEIGHT: 66 IN | DIASTOLIC BLOOD PRESSURE: 78 MMHG

## 2020-12-07 DIAGNOSIS — R09.81 NASAL CONGESTION: ICD-10-CM

## 2020-12-07 DIAGNOSIS — R05.9 COUGH: ICD-10-CM

## 2020-12-07 DIAGNOSIS — J02.9 SORE THROAT: Primary | ICD-10-CM

## 2020-12-07 DIAGNOSIS — Z20.822 EXPOSURE TO COVID-19 VIRUS: ICD-10-CM

## 2020-12-07 PROCEDURE — U0003 INFECTIOUS AGENT DETECTION BY NUCLEIC ACID (DNA OR RNA); SEVERE ACUTE RESPIRATORY SYNDROME CORONAVIRUS 2 (SARS-COV-2) (CORONAVIRUS DISEASE [COVID-19]), AMPLIFIED PROBE TECHNIQUE, MAKING USE OF HIGH THROUGHPUT TECHNOLOGIES AS DESCRIBED BY CMS-2020-01-R: HCPCS

## 2020-12-07 PROCEDURE — 99214 OFFICE O/P EST MOD 30 MIN: CPT | Mod: CS | Performed by: PHYSICIAN ASSISTANT

## 2020-12-07 ASSESSMENT — ENCOUNTER SYMPTOMS
SWOLLEN GLANDS: 0
ABDOMINAL PAIN: 0
SORE THROAT: 1
FEVER: 0
SINUS PAIN: 0
SPUTUM PRODUCTION: 0
COUGH: 1
SHORTNESS OF BREATH: 0
HEADACHES: 0
VOMITING: 0
NAUSEA: 0
CHILLS: 0
RHINORRHEA: 0
DIARRHEA: 0

## 2020-12-07 NOTE — PROGRESS NOTES
Subjective:      Constance Mccullough is a 35 y.o. female who presents with Coronavirus Screening (sore throat, cough, congestion started a week now)    Medications:    • buprenorphine-naloxone Subl  • IBUPROFEN PO  • levothyroxine Tabs    Allergies: Patient has no known allergies.    Problem List: Constance Mccullough has Obesity (BMI 30-39.9); Chronic prescription opiate use; Dysuria; Acute pharyngitis due to other specified organisms; Other acne; and Acquired hypothyroidism on their problem list.    Surgical History:  Past Surgical History:   Procedure Laterality Date   • CHOLECYSTECTOMY      avelino   • CYST EXCISION     • PB C-SEC ONLY,PBEV C-SEC       x 3       Past Social Hx: Constance Mccullough  reports that she has never smoked. She has never used smokeless tobacco. She reports that she does not drink alcohol or use drugs.     Past Family Hx:  Constance Mccullough family history includes Cancer in her mother; Psychiatric Illness in her father.     Problem list, medications, and allergies reviewed by myself today in Epic.         Patient presents with:  Coronavirus Screening: sore throat, cough, congestion started a week now. Pt works at [employer] , does wear a mask but has high interaction with general public, concerned about covid.       URI   This is a new problem. The current episode started in the past 7 days. The problem has been unchanged. Associated symptoms include congestion, coughing and a sore throat. Pertinent negatives include no abdominal pain, chest pain, diarrhea, headaches, nausea, plugged ear sensation, rhinorrhea, sinus pain, swollen glands or vomiting. She has tried increased fluids and NSAIDs for the symptoms. The treatment provided mild relief.       Review of Systems   Constitutional: Negative for chills and fever.   HENT: Positive for congestion and sore throat. Negative for rhinorrhea and sinus pain.    Respiratory: Positive for cough. Negative for sputum production and shortness of breath.   "  Cardiovascular: Negative for chest pain.   Gastrointestinal: Negative for abdominal pain, diarrhea, nausea and vomiting.   Neurological: Negative for headaches.   All other systems reviewed and are negative.         Objective:     /78   Pulse 68   Temp 36.6 °C (97.9 °F)   Resp 16   Ht 1.676 m (5' 6\")   Wt 97.5 kg (215 lb)   SpO2 95%   BMI 34.70 kg/m²      Physical Exam  Vitals signs and nursing note reviewed.   Constitutional:       General: She is not in acute distress.     Appearance: Normal appearance. She is well-developed and normal weight. She is not ill-appearing or toxic-appearing.   HENT:      Head: Normocephalic and atraumatic.      Right Ear: Tympanic membrane normal.      Left Ear: Tympanic membrane normal.      Nose: Congestion present.      Mouth/Throat:      Lips: Pink.      Mouth: Mucous membranes are moist.      Pharynx: Oropharynx is clear. Uvula midline. No posterior oropharyngeal erythema.   Eyes:      Extraocular Movements: Extraocular movements intact.      Conjunctiva/sclera: Conjunctivae normal.      Pupils: Pupils are equal, round, and reactive to light.   Neck:      Musculoskeletal: Normal range of motion and neck supple.   Cardiovascular:      Rate and Rhythm: Normal rate and regular rhythm.      Pulses: Normal pulses.      Heart sounds: Normal heart sounds.   Pulmonary:      Effort: Pulmonary effort is normal.      Breath sounds: Normal breath sounds. No wheezing or rales.   Abdominal:      General: Bowel sounds are normal.      Palpations: Abdomen is soft.   Musculoskeletal: Normal range of motion.   Skin:     General: Skin is warm and dry.      Capillary Refill: Capillary refill takes less than 2 seconds.   Neurological:      General: No focal deficit present.      Mental Status: She is alert and oriented to person, place, and time.      Cranial Nerves: No cranial nerve deficit.      Motor: Motor function is intact.      Coordination: Coordination is intact.      Gait: " Gait normal.   Psychiatric:         Mood and Affect: Mood normal.                Assessment/Plan:         1. Sore throat  COVID/SARS COV-2 PCR   2. Nasal congestion  COVID/SARS COV-2 PCR   3. Cough  COVID/SARS COV-2 PCR   4. Exposure to COVID-19 virus  COVID/SARS COV-2 PCR     Per protocol for PUI/ISO patients, the patient was evaluated by me while I was wearing PPE.  Per CDC guidelines, patient has been instructed to self quarantine at home for at least 10 days from onset of symptoms and at least 3 full days after resolution of fever/respiratory symptoms.  PT verbalized agreement to do so.      PT can continue OTC medications, increase fluids and rest until symptoms improve.     Discussed that I felt this was viral in nature. Did not see any evidence of a bacterial process. Discussed natural progression and sx care.    PT should follow up with PCP in 1-2 days for re-evaluation if symptoms have not improved.  Discussed red flags and reasons to return to UC or ED.  Pt and/or family verbalized understanding of diagnosis and follow up instructions and was offered informational handout on diagnosis.  PT discharged.

## 2020-12-08 DIAGNOSIS — J02.9 SORE THROAT: ICD-10-CM

## 2020-12-08 DIAGNOSIS — R09.81 NASAL CONGESTION: ICD-10-CM

## 2020-12-08 DIAGNOSIS — Z20.822 EXPOSURE TO COVID-19 VIRUS: ICD-10-CM

## 2020-12-08 DIAGNOSIS — R05.9 COUGH: ICD-10-CM

## 2020-12-08 LAB
COVID ORDER STATUS COVID19: NORMAL
SARS-COV-2 RNA RESP QL NAA+PROBE: DETECTED
SPECIMEN SOURCE: ABNORMAL

## 2020-12-09 ENCOUNTER — TELEPHONE (OUTPATIENT)
Dept: URGENT CARE | Facility: CLINIC | Age: 35
End: 2020-12-09

## 2020-12-09 DIAGNOSIS — U07.1 COVID-19 VIRUS INFECTION: ICD-10-CM

## 2020-12-09 NOTE — TELEPHONE ENCOUNTER
Spoke with patient regarding her positive Covid test.  Patient was reminded to quarantine at home per CDC guidelines.  Patient was also informed that her 3 minor children also tested positive for Covid.  Patient verbalized understanding of these test results.

## 2020-12-14 ENCOUNTER — NURSE TRIAGE (OUTPATIENT)
Dept: HEALTH INFORMATION MANAGEMENT | Facility: OTHER | Age: 35
End: 2020-12-14

## 2020-12-14 NOTE — TELEPHONE ENCOUNTER
Pt tested positive for COVID19, starting to have vomiting since 12/12, not able to keep much down except small sips.  Pt encouraged to continue taking in fluids,   Pt stated she has been having her period for longer than normal with cramping.      Reason for Disposition  • [1] COVID-19 infection diagnosed or suspected AND [2] mild symptoms (fever, cough) AND [3] no trouble breathing or other complications    Additional Information  • Negative: SEVERE difficulty breathing (e.g., struggling for each breath, speaks in single words)  • Negative: Difficult to awaken or acting confused (e.g., disoriented, slurred speech)  • Negative: Bluish (or gray) lips or face now  • Negative: Shock suspected (e.g., cold/pale/clammy skin, too weak to stand, low BP, rapid pulse)  • Negative: Sounds like a life-threatening emergency to the triager  • Negative: [1] COVID-19 suspected (e.g., cough, fever, shortness of breath) AND [2] public health department recommends testing  • Negative: [1] COVID-19 exposure AND [2] no symptoms  • Negative: COVID-19 and Breastfeeding, questions about  • Negative: SEVERE or constant chest pain (Exception: mild central chest pain, present only when coughing)  • Negative: MODERATE difficulty breathing (e.g., speaks in phrases, SOB even at rest, pulse 100-120)  • Negative: MILD difficulty breathing (e.g., minimal/no SOB at rest, SOB with walking, pulse <100)  • Negative: Chest pain  • Negative: Patient sounds very sick or weak to the triager  • Negative: Fever > 103 F (39.4 C)  • Negative: [1] Fever > 101 F (38.3 C) AND [2] age > 60  • Negative: [1] Fever > 100.0 F (37.8 C) AND [2] bedridden (e.g., nursing home patient, CVA, chronic illness, recovering from surgery)  • Negative: HIGH RISK patient (e.g., age > 64 years, diabetes, heart or lung disease, weak immune system)  • Negative: Fever present > 3 days (72 hours)  • Negative: [1] Fever returns after gone for over 24 hours AND [2] symptoms worse or not  "improved  • Negative: [1] Continuous (nonstop) coughing interferes with work or school AND [2] no improvement using cough treatment per protocol  • Negative: Cough present > 3 weeks  • Negative: COVID-19, questions about  • Negative: COVID-19 Home Isolation, questions about  • Negative: COVID-19 Prevention and Healthy Living, questions about  • Negative: COVID-19 Testing, questions about    Answer Assessment - Initial Assessment Questions  1. COVID-19 DIAGNOSIS: \"Who made your Coronavirus (COVID-19) diagnosis?\" \"Was it confirmed by a positive lab test?\" If not diagnosed by a HCP, ask \"Are there lots of cases (community spread) where you live?\" (See public health department website, if unsure)    * MAJOR community spread: high number of cases; numbers of cases are increasing; many people hospitalized.    * MINOR community spread: low number of cases; not increasing; few or no people hospitalized      major  2. ONSET: \"When did the COVID-19 symptoms start?\"     12/1  3. WORST SYMPTOM: \"What is your worst symptom?\" (e.g., cough, fever, shortness of breath, muscle aches)      vomit  4. COUGH: \"How bad is the cough?\"        no  5. FEVER: \"Do you have a fever?\" If so, ask: \"What is your temperature, how was it measured, and when did it start?\"     12/14 99.0f  6. RESPIRATORY STATUS: \"Describe your breathing?\" (e.g., shortness of breath, wheezing, unable to speak)       no  7. BETTER-SAME-WORSE: \"Are you getting better, staying the same or getting worse compared to yesterday?\"  If getting worse, ask, \"In what way?\"      worse  8. HIGH RISK DISEASE: \"Do you have any chronic medical problems?\" (e.g., asthma, heart or lung disease, weak immune system, etc.)      no  9. PREGNANCY: \"Is there any chance you are pregnant?\" \"When was your last menstrual period?\"      12/14 still going on  10. OTHER SYMPTOMS: \"Do you have any other symptoms?\"  (e.g., runny nose, headache, sore throat, loss of smell)        Headaches, vomiting, body " aches, chills    Protocols used: CORONAVIRUS (COVID-19) DIAGNOSED OR [id number]

## 2021-02-19 ENCOUNTER — GYNECOLOGY VISIT (OUTPATIENT)
Dept: OBGYN | Facility: CLINIC | Age: 36
End: 2021-02-19
Payer: COMMERCIAL

## 2021-02-19 ENCOUNTER — HOSPITAL ENCOUNTER (OUTPATIENT)
Dept: LAB | Facility: MEDICAL CENTER | Age: 36
End: 2021-02-19
Attending: OBSTETRICS & GYNECOLOGY
Payer: COMMERCIAL

## 2021-02-19 VITALS — DIASTOLIC BLOOD PRESSURE: 70 MMHG | SYSTOLIC BLOOD PRESSURE: 134 MMHG | BODY MASS INDEX: 34.54 KG/M2 | WEIGHT: 214 LBS

## 2021-02-19 DIAGNOSIS — Z98.891 H/O: C-SECTION: ICD-10-CM

## 2021-02-19 DIAGNOSIS — N93.9 ABNORMAL UTERINE BLEEDING (AUB): ICD-10-CM

## 2021-02-19 DIAGNOSIS — E66.9 OBESITY (BMI 30-39.9): ICD-10-CM

## 2021-02-19 DIAGNOSIS — R53.83 TIREDNESS: ICD-10-CM

## 2021-02-19 LAB
APTT PPP: 31.1 SEC (ref 24.7–36)
BASOPHILS # BLD AUTO: 0.3 % (ref 0–1.8)
BASOPHILS # BLD: 0.02 K/UL (ref 0–0.12)
EOSINOPHIL # BLD AUTO: 0.12 K/UL (ref 0–0.51)
EOSINOPHIL NFR BLD: 1.8 % (ref 0–6.9)
ERYTHROCYTE [DISTWIDTH] IN BLOOD BY AUTOMATED COUNT: 40 FL (ref 35.9–50)
HCT VFR BLD AUTO: 41.1 % (ref 37–47)
HGB BLD-MCNC: 13.8 G/DL (ref 12–16)
IMM GRANULOCYTES # BLD AUTO: 0.01 K/UL (ref 0–0.11)
IMM GRANULOCYTES NFR BLD AUTO: 0.1 % (ref 0–0.9)
INR PPP: 1.07 (ref 0.87–1.13)
LYMPHOCYTES # BLD AUTO: 2.2 K/UL (ref 1–4.8)
LYMPHOCYTES NFR BLD: 33 % (ref 22–41)
MCH RBC QN AUTO: 28.6 PG (ref 27–33)
MCHC RBC AUTO-ENTMCNC: 33.6 G/DL (ref 33.6–35)
MCV RBC AUTO: 85.1 FL (ref 81.4–97.8)
MONOCYTES # BLD AUTO: 0.41 K/UL (ref 0–0.85)
MONOCYTES NFR BLD AUTO: 6.1 % (ref 0–13.4)
NEUTROPHILS # BLD AUTO: 3.91 K/UL (ref 2–7.15)
NEUTROPHILS NFR BLD: 58.7 % (ref 44–72)
NRBC # BLD AUTO: 0 K/UL
NRBC BLD-RTO: 0 /100 WBC
PLATELET # BLD AUTO: 318 K/UL (ref 164–446)
PMV BLD AUTO: 10.1 FL (ref 9–12.9)
PROTHROMBIN TIME: 14.2 SEC (ref 12–14.6)
RBC # BLD AUTO: 4.83 M/UL (ref 4.2–5.4)
T4 SERPL-MCNC: 5.5 UG/DL (ref 4–12)
TSH SERPL DL<=0.005 MIU/L-ACNC: 2.03 UIU/ML (ref 0.38–5.33)
WBC # BLD AUTO: 6.7 K/UL (ref 4.8–10.8)

## 2021-02-19 PROCEDURE — 85025 COMPLETE CBC W/AUTO DIFF WBC: CPT

## 2021-02-19 PROCEDURE — 80053 COMPREHEN METABOLIC PANEL: CPT

## 2021-02-19 PROCEDURE — 36415 COLL VENOUS BLD VENIPUNCTURE: CPT

## 2021-02-19 PROCEDURE — 84436 ASSAY OF TOTAL THYROXINE: CPT

## 2021-02-19 PROCEDURE — 99203 OFFICE O/P NEW LOW 30 MIN: CPT | Performed by: OBSTETRICS & GYNECOLOGY

## 2021-02-19 PROCEDURE — 85730 THROMBOPLASTIN TIME PARTIAL: CPT

## 2021-02-19 PROCEDURE — 84443 ASSAY THYROID STIM HORMONE: CPT

## 2021-02-19 PROCEDURE — 85610 PROTHROMBIN TIME: CPT

## 2021-02-19 NOTE — NON-PROVIDER
Pt here for New Patient to discuss abnormal bleeding  LMP: 1/26/21  Phone:[phone]   Repeat /90

## 2021-02-19 NOTE — PROGRESS NOTES
Subjective:      Constance Mccullough is a 35 y.o. female who presents as New Patient (Abnormal bleeding)            HPI patient is a 35-year-old  who presents today to establish gynecologic care and to discuss evaluation of abnormal uterine bleeding.  Patient states she has always had heavy menstrual bleeding monthly and her bleeding can last 7 to 10 days each month.  She states sheis she will just need weekly follow-ups feeling some generalized tiredness since November during which time she got Covid infection for the second time and had severe symptoms.  Patient states during the time of Covid infection in November, when she was having her menstruation and was coughing a lot from her upper respiratory infection, she was gushing blood each time she coughed which made her worried.  She has not had similar symptoms since but has having monthly menstrual bleeding.  States she has recovered from the Covid infection.    Patient states she recently moved back to the area and she believes her last Pap smear may be 2 years or greater.  States she had one abnormal Pap smear many years ago and had follow-up Pap smears which were all normal.    Patient is  for the past 20 years/monogamous.    Patient has had 3 C-sections.  Her  had vasectomy about 10 years ago    States she has hard time losing weight.  States she was diagnosed with possible subclinical hypothyroidism several years ago and was put on Synthroid but she stopped medication more than a year ago and has not had follow-up thyroid evaluation.    Patient has multiple questions today regarding her daughters who are 13 and 16 years old regarding the menstruation and treatment for heavy menstruation which I discussed and provided brochures on different treatment options.    Patient has had history of chronic opiate use and is now on Suboxone therapy which she reports to be tapering.    ROS all organ systems are reviewed and were negative except for  complaints in HPI       Objective:     /70   Wt 97.1 kg (214 lb)   LMP 01/26/2021   BMI 34.54 kg/m²      Physical Exam  Vitals and nursing note reviewed. Exam conducted with a chaperone present.   Constitutional:       General: She is not in acute distress.     Appearance: Normal appearance. She is obese. She is not toxic-appearing.   Neurological:      General: No focal deficit present.      Mental Status: She is alert and oriented to person, place, and time.      Coordination: Coordination normal.      Gait: Gait normal.   Psychiatric:         Mood and Affect: Mood normal.         Behavior: Behavior normal.         Thought Content: Thought content normal.         Judgment: Judgment normal.          Discussion:    1. I discussed abnormal uterine bleeding and possible etiology of abnormal bleeding.  I discussed evaluation of abnormal uterine bleeding.  Patient did not want an exam today and I discussed that she will need a full physical exam including Pap smear which she agrees to do at her follow-up visit.  I discussed work-up of abnormal uterine bleeding including ultrasound and patient agrees for ultrasound which was ordered today.  I discussed that she will need endometrial sampling which she wants to do at her follow-up visit    2.  I discussed different treatment options for abnormal bleeding if endometrial sampling is benign.  I discussed treatment options from different hormonal therapy and other treatment options including surgical treatment via hysterectomy.  I discussed that she is not a candidate for endometrial ablation since she has had 3 prior C-sections.    3.  I discussed her tiredness and discussed need for lab work.  I ordered thyroid function testing CBC chemistry panel and PT PTT.  Patient states she will do all her blood work today and will schedule her ultrasound as soon as possible then will follow up with me in office with exam and biopsy    4.  Patient had multiple questions  regarding her daughters 1 is soon-to-be 13 and the other is 16 years old regarding cycle control and treatment options and have reviewed different treatment options/different hormonal therapy options and brochures were provided.  Patient states she will make appointment for her daughters to be seen.       Assessment/Plan:        1. Abnormal uterine bleeding (AUB)  35-year-old multiparous female with abnormal uterine bleeding in November during time of Covid infection/history of heavy menstruation.  I discussed abnormal uterine bleeding and possible etiology and I discussed evaluation of abnormal bleeding.  Patient will follow-up after ultrasound for endometrial sampling and Pap smear.  - US-PELVIC TRANSVAGINAL ONLY; Future  - CBC WITH DIFFERENTIAL  - COMP METABOLIC PANEL  - T4 AND TSH  - PT AND PTT    2. Obesity (BMI 30-39.9)  Obesity and risks were discussed.  Diet exercise and weight management were reviewed    3. Tiredness  We discussed possible etiology of tiredness including anemia.  Labs were ordered including thyroid function testing    4.  Precautions and plan of care were reviewed.  25 minutes spent with patient today.  All time was for face-to-face counseling regarding her complaints above    5. H/O:  x3

## 2021-02-20 LAB
ALBUMIN SERPL BCP-MCNC: 4.3 G/DL (ref 3.2–4.9)
ALBUMIN/GLOB SERPL: 1.4 G/DL
ALP SERPL-CCNC: 69 U/L (ref 30–99)
ALT SERPL-CCNC: 20 U/L (ref 2–50)
ANION GAP SERPL CALC-SCNC: 9 MMOL/L (ref 7–16)
AST SERPL-CCNC: 21 U/L (ref 12–45)
BILIRUB SERPL-MCNC: 0.2 MG/DL (ref 0.1–1.5)
BUN SERPL-MCNC: 9 MG/DL (ref 8–22)
CALCIUM SERPL-MCNC: 8.9 MG/DL (ref 8.5–10.5)
CHLORIDE SERPL-SCNC: 105 MMOL/L (ref 96–112)
CO2 SERPL-SCNC: 27 MMOL/L (ref 20–33)
CREAT SERPL-MCNC: 0.84 MG/DL (ref 0.5–1.4)
GLOBULIN SER CALC-MCNC: 3.1 G/DL (ref 1.9–3.5)
GLUCOSE SERPL-MCNC: 112 MG/DL (ref 65–99)
POTASSIUM SERPL-SCNC: 3.9 MMOL/L (ref 3.6–5.5)
PROT SERPL-MCNC: 7.4 G/DL (ref 6–8.2)
SODIUM SERPL-SCNC: 141 MMOL/L (ref 135–145)

## 2021-03-05 ENCOUNTER — HOSPITAL ENCOUNTER (OUTPATIENT)
Dept: RADIOLOGY | Facility: MEDICAL CENTER | Age: 36
End: 2021-03-05
Attending: OBSTETRICS & GYNECOLOGY
Payer: COMMERCIAL

## 2021-03-05 DIAGNOSIS — N93.9 ABNORMAL UTERINE BLEEDING (AUB): ICD-10-CM

## 2021-03-05 PROCEDURE — 76830 TRANSVAGINAL US NON-OB: CPT

## 2021-03-12 ENCOUNTER — TELEPHONE (OUTPATIENT)
Dept: OBGYN | Facility: CLINIC | Age: 36
End: 2021-03-12

## 2021-03-13 NOTE — TELEPHONE ENCOUNTER
----- Message from Kapil Mirza M.D. sent at 3/12/2021  9:17 AM PST -----  Pelvic ultrasound was normal.  Patient to follow-up for endometrial biopsy    3/12/21 1633 Called pt, [facility] to reach her. [facility]

## 2021-03-17 ENCOUNTER — TELEPHONE (OUTPATIENT)
Dept: OBGYN | Facility: CLINIC | Age: 36
End: 2021-03-17

## 2021-03-17 NOTE — TELEPHONE ENCOUNTER
----- Message from Kapil Mirza M.D. sent at 3/12/2021  9:17 AM PST -----  Pelvic ultrasound was normal.  Patient to follow-up for endometrial biopsy    Pt understood and had no further questions. Pt understood and stated she has appt set up already.

## 2022-01-14 ENCOUNTER — HOSPITAL ENCOUNTER (OUTPATIENT)
Facility: MEDICAL CENTER | Age: 37
End: 2022-01-14
Attending: NURSE PRACTITIONER
Payer: COMMERCIAL

## 2022-01-14 ENCOUNTER — OFFICE VISIT (OUTPATIENT)
Dept: MEDICAL GROUP | Facility: PHYSICIAN GROUP | Age: 37
End: 2022-01-14
Payer: COMMERCIAL

## 2022-01-14 VITALS
WEIGHT: 211.8 LBS | HEART RATE: 103 BPM | SYSTOLIC BLOOD PRESSURE: 116 MMHG | DIASTOLIC BLOOD PRESSURE: 64 MMHG | OXYGEN SATURATION: 94 % | HEIGHT: 65 IN | BODY MASS INDEX: 35.29 KG/M2 | TEMPERATURE: 98.1 F

## 2022-01-14 DIAGNOSIS — R09.89 RUNNY NOSE: ICD-10-CM

## 2022-01-14 DIAGNOSIS — R05.9 COUGH: ICD-10-CM

## 2022-01-14 DIAGNOSIS — F41.9 ANXIETY: ICD-10-CM

## 2022-01-14 DIAGNOSIS — R09.81 SINUS CONGESTION: ICD-10-CM

## 2022-01-14 DIAGNOSIS — Z00.00 ANNUAL PHYSICAL EXAM: ICD-10-CM

## 2022-01-14 DIAGNOSIS — M53.3 COCCYX PAIN: ICD-10-CM

## 2022-01-14 DIAGNOSIS — N93.9 ABNORMAL UTERINE BLEEDING (AUB): ICD-10-CM

## 2022-01-14 DIAGNOSIS — E03.9 ACQUIRED HYPOTHYROIDISM: ICD-10-CM

## 2022-01-14 DIAGNOSIS — G89.21 CHRONIC PAIN DUE TO TRAUMA: ICD-10-CM

## 2022-01-14 LAB
25(OH)D3 SERPL-MCNC: 18 NG/ML (ref 30–100)
ALBUMIN SERPL BCP-MCNC: 4.4 G/DL (ref 3.2–4.9)
ALBUMIN/GLOB SERPL: 1.5 G/DL
ALP SERPL-CCNC: 82 U/L (ref 30–99)
ALT SERPL-CCNC: 33 U/L (ref 2–50)
ANION GAP SERPL CALC-SCNC: 8 MMOL/L (ref 7–16)
AST SERPL-CCNC: 20 U/L (ref 12–45)
BILIRUB SERPL-MCNC: 0.3 MG/DL (ref 0.1–1.5)
BUN SERPL-MCNC: 8 MG/DL (ref 8–22)
CALCIUM SERPL-MCNC: 9.1 MG/DL (ref 8.5–10.5)
CHLORIDE SERPL-SCNC: 106 MMOL/L (ref 96–112)
CHOLEST SERPL-MCNC: 214 MG/DL (ref 100–199)
CO2 SERPL-SCNC: 26 MMOL/L (ref 20–33)
CREAT SERPL-MCNC: 0.77 MG/DL (ref 0.5–1.4)
ERYTHROCYTE [DISTWIDTH] IN BLOOD BY AUTOMATED COUNT: 40.2 FL (ref 35.9–50)
FASTING STATUS PATIENT QL REPORTED: NORMAL
GLOBULIN SER CALC-MCNC: 3 G/DL (ref 1.9–3.5)
GLUCOSE SERPL-MCNC: 89 MG/DL (ref 65–99)
HCT VFR BLD AUTO: 43.7 % (ref 37–47)
HDLC SERPL-MCNC: 40 MG/DL
HGB BLD-MCNC: 14.3 G/DL (ref 12–16)
LDLC SERPL CALC-MCNC: 153 MG/DL
MCH RBC QN AUTO: 27.4 PG (ref 27–33)
MCHC RBC AUTO-ENTMCNC: 32.7 G/DL (ref 33.6–35)
MCV RBC AUTO: 83.7 FL (ref 81.4–97.8)
PLATELET # BLD AUTO: 206 K/UL (ref 164–446)
PMV BLD AUTO: 10.9 FL (ref 9–12.9)
POTASSIUM SERPL-SCNC: 4.6 MMOL/L (ref 3.6–5.5)
PROT SERPL-MCNC: 7.4 G/DL (ref 6–8.2)
RBC # BLD AUTO: 5.22 M/UL (ref 4.2–5.4)
SARS-COV-2 AB SERPL QL IA: REACTIVE
SODIUM SERPL-SCNC: 140 MMOL/L (ref 135–145)
T4 FREE SERPL-MCNC: 0.76 NG/DL (ref 0.93–1.7)
TRIGL SERPL-MCNC: 103 MG/DL (ref 0–149)
TSH SERPL DL<=0.005 MIU/L-ACNC: 2.64 UIU/ML (ref 0.38–5.33)
WBC # BLD AUTO: 3.7 K/UL (ref 4.8–10.8)

## 2022-01-14 PROCEDURE — 84443 ASSAY THYROID STIM HORMONE: CPT

## 2022-01-14 PROCEDURE — 36415 COLL VENOUS BLD VENIPUNCTURE: CPT

## 2022-01-14 PROCEDURE — 99214 OFFICE O/P EST MOD 30 MIN: CPT | Performed by: NURSE PRACTITIONER

## 2022-01-14 PROCEDURE — 80053 COMPREHEN METABOLIC PANEL: CPT

## 2022-01-14 PROCEDURE — 86769 SARS-COV-2 COVID-19 ANTIBODY: CPT

## 2022-01-14 PROCEDURE — 84439 ASSAY OF FREE THYROXINE: CPT

## 2022-01-14 PROCEDURE — 85027 COMPLETE CBC AUTOMATED: CPT

## 2022-01-14 PROCEDURE — 82306 VITAMIN D 25 HYDROXY: CPT

## 2022-01-14 PROCEDURE — 80061 LIPID PANEL: CPT

## 2022-01-14 RX ORDER — EPINEPHRINE 0.1 MG/ML
1 SYRINGE (ML) INJECTION ONCE
COMMUNITY
End: 2022-01-14

## 2022-01-14 RX ORDER — GUAIFENESIN 600 MG/1
600 TABLET, EXTENDED RELEASE ORAL EVERY 12 HOURS
Qty: 60 TABLET | Refills: 1 | Status: SHIPPED | OUTPATIENT
Start: 2022-01-14 | End: 2022-01-24

## 2022-01-14 RX ORDER — PROPRANOLOL HYDROCHLORIDE 10 MG/1
10 TABLET ORAL 2 TIMES DAILY
Qty: 90 TABLET | Refills: 1 | Status: SHIPPED | OUTPATIENT
Start: 2022-01-14 | End: 2022-02-01

## 2022-01-14 RX ORDER — FLUTICASONE PROPIONATE 50 MCG
1 SPRAY, SUSPENSION (ML) NASAL
Qty: 16 G | Refills: 1 | Status: SHIPPED | OUTPATIENT
Start: 2022-01-14 | End: 2022-02-01

## 2022-01-14 RX ORDER — BENZONATATE 100 MG/1
100 CAPSULE ORAL 3 TIMES DAILY PRN
Qty: 60 CAPSULE | Refills: 0 | Status: SHIPPED | OUTPATIENT
Start: 2022-01-14

## 2022-01-14 RX ORDER — PROPRANOLOL HYDROCHLORIDE 10 MG/1
10 TABLET ORAL 2 TIMES DAILY
Qty: 90 TABLET | Refills: 1 | Status: SHIPPED | OUTPATIENT
Start: 2022-01-14 | End: 2022-01-14 | Stop reason: SDUPTHER

## 2022-01-14 ASSESSMENT — PATIENT HEALTH QUESTIONNAIRE - PHQ9: CLINICAL INTERPRETATION OF PHQ2 SCORE: 0

## 2022-01-14 ASSESSMENT — FIBROSIS 4 INDEX: FIB4 SCORE: 0.56
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
## 2022-01-14 NOTE — ASSESSMENT & PLAN NOTE
Your Child's Health  11-14 Year Old Visit      Nick Langley  April 14, 2021    There were no vitals taken for this visit.  Weight:       YOUR CHILD'S 11 to 14 YEAR-OLD VISIT    Personal Safety  Violence in the community and schools can impact a child's physical and emotional health. Talk to your child about bullying and emphasize that it should always be reported. Work with schools and administrators to address bullying and intimidation in your child's school. Your child should know they can always call you when they are uncomfortable or concerned that they are getting into a dangerous situation. In dating situations, teens need to know it is always okay to say \"no\" to something they are uncomfortable with and that \"no\" means NO and must be respected. Sadly, youth in this age range are most likely to be targeted for exploitation (human trafficking, prostitution). Tell your child that they should never form a relationship with someone who forbids them to tell their parents about it - these are always dangerous situations.    Smoking and Other Drugs  It's always important to tell your child that you do not want them to smoke or use drugs. When there are family members, friends, or peers who are smoking, discuss with your child how strong the addiction is and how difficult it is to quit. Studies show that youth who are exposed to e-cigarettes are more likely to try them themselves, and using e-cigarettes is a risk factor for smoking regular cigarettes.    Self-Esteem & Emotional Health  A strong sense of self-esteem contributes to good mental health and can help your child succeed in many aspects of life. As your child is becoming more independent, it is still important to spend time together as a family, start discussions about lots of topics (not just about things you disagree on), listen respectfully to your child and answer questions honestly. Make sure you are clear about family rules and expectations  Results for ABIMBOLA RIVAS (MRN [id number]) as of 1/14/2022 09:37   Ref. Range 2/19/2021 17:01   TSH Latest Ref Range: 0.380 - 5.330 uIU/mL 2.030   Thyroxine -T4 Latest Ref Range: 4.0 - 12.0 ug/dL 5.5   Not on any medicine.  Reports weight gain is the only issue.   regarding their dress, activities, media use, etc.    Success in school also builds self-esteem. Children who do well in school are less likely to be involved in risky behaviors. As school becomes more challenging academically, increasing demands will cause some stress that your child needs to learn to handle in a healthy way. Also, subtle learning or attention problems which may have been overlooked previously may become evident at higher grade levels. Poor school performance could also be a sign of anxiety or depression. Frequent absenteeism is a risk factor for dropping out of school; talk to your doctors or teachers if your child is missing a lot of school.    Involvement with activities that really interest your child is another way to build their self-esteem. Even if they are having some struggles with schoolwork, try to find an opportunity for them to pursue something that really interests them, like music, art, drama or sports.    Preteens and young teens can often be fu and withdrawn, and this is often normal as long as it does not last long or significantly impact their schoolwork, activities, or relationships. Depression can impact adolescents with typical symptoms being irritability, persistent boredom, poor school performance and withdrawal from activities and relationships. Young teens identifying as lesbian, lyon, bisexual, transgender, or questioning are particularly at risk for emotional health problems; family acceptance of these young people is one of the strongest factors leading to positive outcomes for them.    Sexuality  While most youth in this age range have not had sexual experiences, your child needs to know that you do not want them to engage in sexual activity and that they can come to you with any questions about sex. Not having sex is the safest way to prevent pregnancy and avoid sexually transmitted infections. Experimenting with drugs or alcohol will increase your child's risk of  making poor decisions, which is another reason to continue to remind them that you do not want them to use drugs or alcohol. Studies show that parents have more influence on teen sexual values and decision-making than peers, media, siblings, teachers, and Hinduism teachings. But you can't ignore the topic --talk about it! Use what is going on at school, what they are seeing on TV and what is happening with friends to talk about relationships and sex. Discuss how they can respond to pressures to use drugs or engage in sexual activity. If you aren't sure how to talk to your child about sex, find resources at the American Academy of Pediatrics healthychildren.org website.     Dental Health  It is important to care for permanent teeth by brushing at least twice a day with a fluoridated toothpaste,  flossing daily and seeing a dentist regularly. Limiting carbonated sodas and other sweet beverages is also important to prevent tooth decay. Gum chewers should choose sugarless gum. Youth who are active in contact sports should wear a mouth guard. Let us know if your water supply comes from a well; fluoride supplements may be indicated.    Body Image and Healthy Lifestyle  Youth at this age can be very sensitive to how they look compared to others. The media, unfortunately, frequently presents unhealthy, unrealistic body images to youth. It's not news that a healthy body size is obtained by eating healthy foods and being physically active every day and limiting unhealthy habits like junk food and too much time spent just sitting. Encourage healthy habits in your child and remember to provide praise about all of their good aspects, not just how they look.    Make your child a partner in healthy lifestyle choices by having them help with meal planning, shopping, and food preparation at this age. Do your best to eat meals as a family as often as you can, despite busy schedules. (And remember to keep the TV off and phones away  from the table - this is one of the best times for a good family face-to-face communication.) Keep healthy choices in the home for in between meals snacks and do NOT keep a lot of junk food and unhealthy snacks in your home - if they are there, your children will eat them! Encourage lots of water to drink and avoid keeping soda and other sweet beverages in your home on a regular basis. Calcium and vitamin D remain very important for optimal bone health at this age. Your child needs 3 to 4 servings of a good source of calcium daily (low-fat or skim milk, yogurt, cheese, calcium-fortified orange juice or other foods). 600 IU of vitamin D daily is recommended. Most people cannot meet their vitamin D needs with their usual diet, so a multivitamin with iron supplement is a good way to get it. (A pure vitamin D supplement with 400 or 600 IU is also okay.)    Finding time to be physically active every day is a challenge with increasing school work and other activities. Encourage at least 60 minutes of physical activity each day. It does not have to be all at the same time. Physical education classes can count for some of it, but other activities, such as biking, dancing, and simply playing with the kids in the neighborhood are usually needed to get to the goal of 60 minutes a day. When participating in sports, make sure appropriate safety equipment is used (helmet, mouth  guard, eye protection, wrist guards, elbow and knee pads, athletic supporter). Limiting how much screen time or media use your child has is often necessary to make sure they have time for the recommended physical activity and exercise.    Sleep   Adequate sleep is important to all aspects of your child's health. Using digital devices before bedtime can interfere with quality sleep. Keeping electronic devices out of your child's room at night is a great step towards ensuring adequate sleep and rest. If you don't already have set rules about electronic  media use, check out the American Academy of Pediatrics healthychildren.org website (search for “media use plan”).    Safety On the Land and In the Water   Seatbelts do not fit properly until a child is taller than 4'9\" and weighs more than 80 pounds. Smaller kids in this age group won’t like riding in a booster seat but that is where they are safest. High-backed booster seats should be used if there are low seat backs or no head rests in your car; backless boosters can be used if your car has high seat backs and head rests. Children are safest in the back seat until they are 13 years old. Talk to your child about never getting into a car with someone who is under the influence of drugs or alcohol; let them know that they can always call you for help if this situation ever occurs.     Helmets and other protective gear should always be worn when biking, skating or skateboarding; they may be recommended for additional sports (kayaking, water polo, etc) for older children.  All-terrain vehicles (ATVs) are very dangerous and adolescents under 16 years of age should not be allowed to ride them. When on a boat or engaging in water sports, a [employer] approved lifejacket should always be worn.    Sun and Gun safety  Most teenagers love the sun, but the ultraviolet radiation of the sun causes skin damage (premature aging) and increases the risk of skin cancer. The same sun protection recommendations apply to all ages: do not spend time the sun without using sunscreen with SPF of 15 or higher, avoid prolonged exposure between 11 and 3 PM when the sun intensity is the highest, and wear sunglasses and other sun protective clothing. Use of tanning beds should not be permitted for adolescents.    If you have an adolescent with a history of depression, suicide attempts, or aggressive behaviors, it is very dangerous to keep a firearm in your home. If firearms are stored in your home, be sure they are stored unloaded and  locked with ammunition locked separately and be sure that children do not have access to the keys.    MEDICATION FOR FEVER OR PAIN:   Acetaminophen liquid (e.g., Tylenol or Tempra) may be given every four hours as needed for pain or fever. Acetaminophen liquid is less concentrated than the infant dropper bottle type. Be sure to check which product CONCENTRATION you are using.    CHILDREN’S Tylenol/Acetaminophen  (160 MG/5 mL)    Child’s Weight:  Dose:  36 - 47 pounds:    240 mg (7.5 mL (1 1/2 Teaspoons))  48 - 59 pounds:    320 mg (10.0 mL (2 Teaspoons))  60 - 71 pounds:    400 mg (12.5 mL (2 1/2 Teaspoons))  72 - 95 pounds:    480 mg (15.0 mL (3 Teaspoons))  Greater than 96 pounds:   640 mg (20.0 mL (4 Teaspoons))    CHILDREN’S Tylenol/Acetaminophen MELTAWAYS ( 80 MG tablets)    Child’s Weight:  Dose:  36 - 47 pounds:    240 mg (3 meltaway tablets)  48 - 59 pounds:    320 mg (4 meltaway tablets)  60 - 71 pounds:    400 mg (5 meltaway tablets)  72 - 95 pounds:    480 mg (6 meltaway tablets)  Greater than 96 pounds:   640 mg (8 meltaway tablets)    Nick (Jr) Tylenol/Acetaminophen MELTAWAYS (160 MG tablets)    Child’s Weight:  Dose:  36 - 47 pounds:    240 mg (1 1/2 meltaway tablets)  48 - 59 pounds:    320 mg (2 meltaway tablets)  60 - 71 pounds:    400 mg (2 1/2 meltaway tablets)  72 - 95 pounds:    480 mg (3 meltaway tablets)  Greater than 96 pounds:   640 mg (4 meltaway tablets)    CHILDREN'S Ibuprofen (e.g., Advil or Motrin) may be given every six hours as needed for pain or fever.    48 - 59 pounds:    200 mg (2 Teaspoons)  60 - 71 pounds:    250 mg (2 1/2 Teaspoons)  72 - 95 pounds:    300 mg (3 Teaspoons)        NEXT VISIT:  IN 1 YEAR    Thank you for entrusting your care to [facility].    Also, check out “Children’s Health” on the [facility] Blog for updates on timely topics regarding children’s health!
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
## 2022-01-14 NOTE — ASSESSMENT & PLAN NOTE
No problem.  Patient reports sinus congestion, runny nose and cough for the last for 5 days.  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  Otherwise no fever, no GI symptoms, no loss of taste or smell, no wheezing.

## 2022-01-14 NOTE — ASSESSMENT & PLAN NOTE
Chronic problem.  Patient has a visible.  Anxiety and insomnia, recurrences.  Currently not on any antiepileptic.  BP today is 116/64 with a pulse of 103.  No SI / HI.

## 2022-01-29 DIAGNOSIS — F41.9 ANXIETY: ICD-10-CM

## 2022-01-31 DIAGNOSIS — F41.9 ANXIETY: ICD-10-CM

## 2022-01-31 DIAGNOSIS — R09.89 RUNNY NOSE: ICD-10-CM

## 2022-01-31 NOTE — TELEPHONE ENCOUNTER
Received request via: Patient    Was the patient seen in the last year in this department? Yes    Does the patient have an active prescription (recently filled or refills available) for medication(s) requested? No       Pt is needing 180 day supply due to insurance

## 2022-02-01 RX ORDER — PROPRANOLOL HYDROCHLORIDE 10 MG/1
TABLET ORAL
Qty: 180 TABLET | Refills: 1 | Status: SHIPPED | OUTPATIENT
Start: 2022-02-01

## 2022-02-01 RX ORDER — FLUTICASONE PROPIONATE 50 MCG
1 SPRAY, SUSPENSION (ML) NASAL
Qty: 48 ML | Refills: 1 | Status: SHIPPED | OUTPATIENT
Start: 2022-02-01

## 2022-02-01 RX ORDER — PROPRANOLOL HYDROCHLORIDE 10 MG/1
10 TABLET ORAL 2 TIMES DAILY
Qty: 180 TABLET | Refills: 1 | Status: SHIPPED | OUTPATIENT
Start: 2022-02-01

## 2022-02-23 ENCOUNTER — PATIENT MESSAGE (OUTPATIENT)
Dept: MEDICAL GROUP | Facility: PHYSICIAN GROUP | Age: 37
End: 2022-02-23
Payer: COMMERCIAL

## 2022-09-23 ENCOUNTER — OFFICE VISIT (OUTPATIENT)
Dept: MEDICAL GROUP | Facility: PHYSICIAN GROUP | Age: 37
End: 2022-09-23
Payer: COMMERCIAL

## 2022-09-23 VITALS
OXYGEN SATURATION: 99 % | WEIGHT: 210 LBS | SYSTOLIC BLOOD PRESSURE: 100 MMHG | RESPIRATION RATE: 16 BRPM | DIASTOLIC BLOOD PRESSURE: 76 MMHG | HEIGHT: 66 IN | HEART RATE: 96 BPM | BODY MASS INDEX: 33.75 KG/M2 | TEMPERATURE: 98.6 F

## 2022-09-23 DIAGNOSIS — H92.02 EAR PAIN, LEFT: ICD-10-CM

## 2022-09-23 DIAGNOSIS — H83.2X2 VESTIBULAR DISEQUILIBRIUM INVOLVING LEFT INNER EAR: ICD-10-CM

## 2022-09-23 PROCEDURE — 99214 OFFICE O/P EST MOD 30 MIN: CPT | Performed by: NURSE PRACTITIONER

## 2022-09-23 ASSESSMENT — FIBROSIS 4 INDEX: FIB4 SCORE: 0.66

## 2022-09-23 NOTE — ASSESSMENT & PLAN NOTE
New problem.  Tenderness at the left ear x 1 wk, feels plugged and popping.  Had some swelling and tenderness at the exterior canal  in the left ear which has resolved, possible a pimple.  No discharge, no fever. 2 days ago tried ear candling which helped.  Associated symptom is mild sore throat. No HAs, no changes in vision, no dizziness.
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
## 2023-10-24 ENCOUNTER — OFFICE VISIT (OUTPATIENT)
Dept: URGENT CARE | Facility: CLINIC | Age: 38
End: 2023-10-24
Payer: COMMERCIAL

## 2023-10-24 VITALS
TEMPERATURE: 97.9 F | DIASTOLIC BLOOD PRESSURE: 86 MMHG | OXYGEN SATURATION: 99 % | RESPIRATION RATE: 18 BRPM | SYSTOLIC BLOOD PRESSURE: 124 MMHG | HEIGHT: 65 IN | HEART RATE: 106 BPM | WEIGHT: 225 LBS | BODY MASS INDEX: 37.49 KG/M2

## 2023-10-24 DIAGNOSIS — K04.7 DENTAL INFECTION: ICD-10-CM

## 2023-10-24 DIAGNOSIS — K05.6 PERIODONTAL DISEASE, UNSPECIFIED: ICD-10-CM

## 2023-10-24 PROCEDURE — 3074F SYST BP LT 130 MM HG: CPT | Performed by: STUDENT IN AN ORGANIZED HEALTH CARE EDUCATION/TRAINING PROGRAM

## 2023-10-24 PROCEDURE — 3079F DIAST BP 80-89 MM HG: CPT | Performed by: STUDENT IN AN ORGANIZED HEALTH CARE EDUCATION/TRAINING PROGRAM

## 2023-10-24 PROCEDURE — 99213 OFFICE O/P EST LOW 20 MIN: CPT | Performed by: STUDENT IN AN ORGANIZED HEALTH CARE EDUCATION/TRAINING PROGRAM

## 2023-10-24 RX ORDER — AMOXICILLIN AND CLAVULANATE POTASSIUM 875; 125 MG/1; MG/1
1 TABLET, FILM COATED ORAL 2 TIMES DAILY
Qty: 14 TABLET | Refills: 0 | Status: SHIPPED | OUTPATIENT
Start: 2023-10-24 | End: 2023-10-31

## 2023-10-24 ASSESSMENT — FIBROSIS 4 INDEX: FIB4 SCORE: 0.68

## 2023-10-25 NOTE — PROGRESS NOTES
"Subjective     Constance Mccullough is a 38 y.o. female who presents with Other (Infection  in mouth x 3 days)            Constance is a 38 y.o. female who presents to urgent care with dental infection.  Patient states that she is currently following up with dentist and periodontist due to teeth constantly cracking. Patient reports couple missing/cracked teeth.  Patient noticed approximately 3 days ago that she had pain and swelling to lower jaw localized to cracked teeth.  Patient has not noticed any active drainage.  No fever/chills.  No swelling of face/neck. No difficulty swallowing. No SOB, wheezing or difficulty breathing.        Review of Systems   Constitutional:  Negative for chills, fever and malaise/fatigue.   HENT:  Negative for sore throat.         + dental pain/infection   Respiratory:  Negative for cough, shortness of breath and wheezing.    Gastrointestinal:  Negative for abdominal pain, constipation, diarrhea, nausea and vomiting.   All other systems reviewed and are negative.             Objective     /86   Pulse (!) 106   Temp 36.6 °C (97.9 °F) (Temporal)   Resp 18   Ht 1.651 m (5' 5\")   Wt 102 kg (225 lb)   SpO2 99%   BMI 37.44 kg/m²      Physical Exam  Vitals reviewed.   Constitutional:       General: She is not in acute distress.     Appearance: Normal appearance. She is not toxic-appearing.   HENT:      Head: Normocephalic and atraumatic.      Right Ear: Tympanic membrane, ear canal and external ear normal.      Left Ear: Tympanic membrane, ear canal and external ear normal.      Nose: Nose normal.      Mouth/Throat:      Lips: Pink.      Mouth: Mucous membranes are moist. No angioedema.      Dentition: Abnormal dentition. Dental tenderness, gingival swelling and dental caries present.      Pharynx: Oropharynx is clear. Uvula midline. No pharyngeal swelling, posterior oropharyngeal erythema or uvula swelling.      Tonsils: No tonsillar abscesses.   Eyes:      Extraocular Movements: " Extraocular movements intact.      Conjunctiva/sclera: Conjunctivae normal.      Pupils: Pupils are equal, round, and reactive to light.   Cardiovascular:      Rate and Rhythm: Regular rhythm. Tachycardia present.   Pulmonary:      Effort: Pulmonary effort is normal.      Breath sounds: Normal breath sounds.   Musculoskeletal:      Cervical back: Normal range of motion. No rigidity.   Lymphadenopathy:      Cervical: No cervical adenopathy.   Skin:     General: Skin is warm and dry.   Neurological:      General: No focal deficit present.      Mental Status: She is alert. Mental status is at baseline.                             Assessment & Plan        1. Dental infection  - amoxicillin-clavulanate (AUGMENTIN) 875-125 MG Tab; Take 1 Tablet by mouth 2 times a day for 7 days.  Dispense: 14 Tablet; Refill: 0    2. Periodontal disease, unspecified    Follow up with dentist/periodontist asap. Patient has discussed plan with dentist of tooth extraction and dental implants. ER precautions discussed.    Differential diagnoses, supportive care measures and indications for immediate follow-up discussed with patient. Pathogenesis of diagnosis discussed including typical length and natural progression.      Instructed to return to urgent care or nearest emergency department if symptoms fail to improve, for any change in condition, further concerns, or new concerning symptoms.    Patient states understanding and agrees with the plan of care and discharge instructions.

## 2023-10-26 ASSESSMENT — ENCOUNTER SYMPTOMS
WHEEZING: 0
CONSTIPATION: 0
FEVER: 0
COUGH: 0
VOMITING: 0
CHILLS: 0
NAUSEA: 0
DIARRHEA: 0
ABDOMINAL PAIN: 0
SORE THROAT: 0
SHORTNESS OF BREATH: 0

## 2024-04-30 ENCOUNTER — OFFICE VISIT (OUTPATIENT)
Dept: URGENT CARE | Facility: CLINIC | Age: 39
End: 2024-04-30
Payer: COMMERCIAL

## 2024-04-30 VITALS
DIASTOLIC BLOOD PRESSURE: 70 MMHG | BODY MASS INDEX: 38.14 KG/M2 | TEMPERATURE: 97.3 F | WEIGHT: 228.9 LBS | HEART RATE: 109 BPM | OXYGEN SATURATION: 97 % | HEIGHT: 65 IN | RESPIRATION RATE: 14 BRPM | SYSTOLIC BLOOD PRESSURE: 124 MMHG

## 2024-04-30 DIAGNOSIS — T78.3XXA ANGIOEDEMA, INITIAL ENCOUNTER: ICD-10-CM

## 2024-04-30 DIAGNOSIS — K04.7 DENTAL INFECTION: ICD-10-CM

## 2024-04-30 DIAGNOSIS — R21 RASH AND NONSPECIFIC SKIN ERUPTION: ICD-10-CM

## 2024-04-30 RX ORDER — METHYLPREDNISOLONE 4 MG/1
TABLET ORAL
Qty: 21 TABLET | Refills: 0 | Status: SHIPPED | OUTPATIENT
Start: 2024-04-30

## 2024-04-30 RX ORDER — AMOXICILLIN AND CLAVULANATE POTASSIUM 875; 125 MG/1; MG/1
1 TABLET, FILM COATED ORAL 2 TIMES DAILY
Qty: 20 TABLET | Refills: 0 | Status: SHIPPED | OUTPATIENT
Start: 2024-04-30 | End: 2024-05-10

## 2024-04-30 ASSESSMENT — ENCOUNTER SYMPTOMS
SORE THROAT: 0
NAUSEA: 0
CHILLS: 0
HEADACHES: 0
FEVER: 0
VOMITING: 0
SHORTNESS OF BREATH: 0
MYALGIAS: 0

## 2024-04-30 NOTE — LETTER
April 30, 2024         Patient: Constance Mccullough   YOB: 1985   Date of Visit: 4/30/2024           To Whom it May Concern    Constance Mccullough was seen in my clinic on 4/30/2024 for medical reasons.    If you have any questions or concerns, please don't hesitate to call.        Sincerely,           Gabby Gurrola P.A.-C.  Electronically Signed

## 2024-04-30 NOTE — PROGRESS NOTES
"Subjective     Constance Mccullough is a 38 y.o. female who presents with Oral Swelling (Upper lip swelling, soreness, irritation x yesterday )            Patient is here with acute lip swelling that started yesterday. Patient states the lip swelling began shortly after a piece of food got lodged in her upper gums/teeth. Patient is undergoing chronic work with a dentist due to tooth issues. She denies any new exposures. She tried taking Benadryl with no relief. She denies fever, chills, nausea, vomiting, body aches, difficulty breathing or difficulty swallowing. She also reports a rash on her stomach for the past week or so that is not healing with yellow crust.        '  Past Medical History:   Diagnosis Date    C section     Chronic prescription opiate use        Past Surgical History:   Procedure Laterality Date    CHOLECYSTECTOMY      avelino    CYST EXCISION      [place] C-SEC ONLY,PREV C-SEC       x 3       Family History   Problem Relation Age of Onset    Cancer Mother     Psychiatric Illness Father      Allergies:  Banana    Medications, Allergies, and current problem list reviewed today in Epic    Review of Systems   Constitutional:  Negative for chills, fever and malaise/fatigue.   HENT:  Negative for sore throat.         Upper lip swelling   Respiratory:  Negative for shortness of breath.    Gastrointestinal:  Negative for nausea and vomiting.   Musculoskeletal:  Negative for myalgias.   Skin:  Positive for rash. Negative for itching.   Neurological:  Negative for headaches.        All other systems reviewed and are negative.         Objective     /70 (BP Location: Left arm, Patient Position: Sitting, BP Cuff Size: Large adult)   Pulse (!) 109   Temp 36.3 °C (97.3 °F) (Temporal)   Resp 14   Ht 1.651 m (5' 5\")   Wt 104 kg (228 lb 14.4 oz)   SpO2 97%   BMI 38.09 kg/m²      Physical Exam  Constitutional:       General: She is not in acute distress.     Appearance: She is not ill-appearing.   HENT: "      Head: Normocephalic and atraumatic.      Comments: Mild angioedema of upper lip. Gums with moderate edema. Multiple upper teeth with missing enamel.     Mouth/Throat:      Mouth: Mucous membranes are moist.   Eyes:      Conjunctiva/sclera: Conjunctivae normal.   Cardiovascular:      Rate and Rhythm: Regular rhythm. Tachycardia present.   Pulmonary:      Effort: No respiratory distress.      Breath sounds: No stridor. No wheezing.   Skin:     General: Skin is warm and dry.      Comments: 3 scabbed blisters with yellow crust on abdomen.   Neurological:      General: No focal deficit present.      Mental Status: She is alert and oriented to person, place, and time.   Psychiatric:         Mood and Affect: Mood normal.         Behavior: Behavior normal.         Thought Content: Thought content normal.         Judgment: Judgment normal.                             Assessment & Plan        1. Angioedema, initial encounter    - methylPREDNISolone (MEDROL DOSEPAK) 4 MG Tablet Therapy Pack; Follow schedule on package instructions.  Dispense: 21 Tablet; Refill: 0    2. Dental infection    - amoxicillin-clavulanate (AUGMENTIN) 875-125 MG Tab; Take 1 Tablet by mouth 2 times a day for 10 days.  Dispense: 20 Tablet; Refill: 0  - methylPREDNISolone (MEDROL DOSEPAK) 4 MG Tablet Therapy Pack; Follow schedule on package instructions.  Dispense: 21 Tablet; Refill: 0    3. Rash and nonspecific skin eruption    - mupirocin (BACTROBAN) 2 % Ointment; Apply 1 Application topically 2 times a day.  Dispense: 22 g; Refill: 0          Differential diagnoses, Supportive care, and indications for immediate follow-up discussed with patient.   Pathogenesis of diagnosis discussed including typical length and natural progression.   Instructed to return to clinic or nearest emergency department for any change in condition, further concerns, or worsening of symptoms.    The patient demonstrated a good understanding and agreed with the treatment  plan.    Gabby Gurrola P.A.-C.

## 2025-07-07 ENCOUNTER — OFFICE VISIT (OUTPATIENT)
Dept: MEDICAL GROUP | Facility: PHYSICIAN GROUP | Age: 40
End: 2025-07-07
Payer: COMMERCIAL

## 2025-07-07 VITALS
HEIGHT: 65 IN | RESPIRATION RATE: 20 BRPM | BODY MASS INDEX: 36.69 KG/M2 | TEMPERATURE: 98.6 F | HEART RATE: 103 BPM | OXYGEN SATURATION: 95 % | DIASTOLIC BLOOD PRESSURE: 72 MMHG | SYSTOLIC BLOOD PRESSURE: 122 MMHG | WEIGHT: 220.2 LBS

## 2025-07-07 DIAGNOSIS — T78.40XA ALLERGIC REACTION, INITIAL ENCOUNTER: Primary | ICD-10-CM

## 2025-07-07 PROCEDURE — 3078F DIAST BP <80 MM HG: CPT | Performed by: FAMILY MEDICINE

## 2025-07-07 PROCEDURE — 3074F SYST BP LT 130 MM HG: CPT | Performed by: FAMILY MEDICINE

## 2025-07-07 PROCEDURE — 99214 OFFICE O/P EST MOD 30 MIN: CPT | Performed by: FAMILY MEDICINE

## 2025-07-07 RX ORDER — TRIAMCINOLONE ACETONIDE 40 MG/ML
40 INJECTION, SUSPENSION INTRA-ARTICULAR; INTRAMUSCULAR ONCE
Status: COMPLETED | OUTPATIENT
Start: 2025-07-07 | End: 2025-07-07

## 2025-07-07 RX ADMIN — TRIAMCINOLONE ACETONIDE 40 MG: 40 INJECTION, SUSPENSION INTRA-ARTICULAR; INTRAMUSCULAR at 13:57

## 2025-07-07 ASSESSMENT — PATIENT HEALTH QUESTIONNAIRE - PHQ9: CLINICAL INTERPRETATION OF PHQ2 SCORE: 0

## 2025-07-07 ASSESSMENT — ENCOUNTER SYMPTOMS
EYE PAIN: 1
FEVER: 0
BRUISES/BLEEDS EASILY: 0
HEMOPTYSIS: 0
DEPRESSION: 0
NAUSEA: 0
HEARTBURN: 0
CONSTITUTIONAL NEGATIVE: 1
COUGH: 0
PSYCHIATRIC NEGATIVE: 1
TINGLING: 0
MUSCULOSKELETAL NEGATIVE: 1
CHILLS: 0
DIZZINESS: 0
RESPIRATORY NEGATIVE: 1
GASTROINTESTINAL NEGATIVE: 1
HEADACHES: 0
MYALGIAS: 0
PALPITATIONS: 0
CARDIOVASCULAR NEGATIVE: 1
NEUROLOGICAL NEGATIVE: 1
DOUBLE VISION: 0
BLURRED VISION: 0

## 2025-07-07 NOTE — PROGRESS NOTES
Subjective     Constance Mccullough is a 40 y.o. female who presents with Rash (This morning on L side of face, hives )            Itchy and feels swollen on face today and also in eyes, no obvious contagion or contact, no breathing or eating issues. Exam benign would like kenalog shot    1. Allergic reaction, initial encounter     triamcinolone acetonide (Kenalog-40) injection 40 mg    Past Medical History:  No date: C section  No date: Chronic prescription opiate use  Past Surgical History:  No date: CHOLECYSTECTOMY      Comment:  avelino  No date: CYST EXCISION  No date: [place] C-SEC ONLY,PREV C-SEC      Comment:   x 3  Social History    Tobacco Use      Smoking status: Never      Smokeless tobacco: Never    Vaping Use      Vaping status: Never Used    Alcohol use: No    Drug use: No    Review of patient's family history indicates:  Problem: Cancer      Relation: Mother          Age of Onset: (Not Specified)  Problem: Psychiatric Illness      Relation: Father          Age of Onset: (Not Specified)      Current Outpatient Medications: ·  methylPREDNISolone (MEDROL DOSEPAK) 4 MG Tablet Therapy Pack, Follow schedule on package instructions., Disp: 21 Tablet, Rfl: 0·  mupirocin (BACTROBAN) 2 % Ointment, Apply 1 Application topically 2 times a day., Disp: 22 g, Rfl: 0·  propranolol (INDERAL) 10 MG Tab, TAKE 1 TABLET BY MOUTH TWICE A DAY, Disp: 180 Tablet, Rfl: 1·  propranolol (INDERAL) 10 MG Tab, Take 1 Tablet by mouth 2 times a day., Disp: 180 Tablet, Rfl: 1·  fluticasone (FLONASE) 50 MCG/ACT nasal spray, ADMINISTER 1 SPRAY INTO AFFECTED NOSTRIL(S) AT BEDTIME., Disp: 48 mL, Rfl: 1·  benzonatate (TESSALON) 100 MG Cap, Take 1 Capsule by mouth 3 times a day as needed for Cough., Disp: 60 Capsule, Rfl: 0·  IBUPROFEN PO, Take  by mouth., Disp: , Rfl: ·  buprenorphine-naloxone (SUBOXONE) 8-2 MG SL Tab, DISSOVE 1 TABLET UNDER THE TONGUE TWICE DAILY SUBLINGUAL 30 DAYS, Disp: , Rfl: 1    Patient was instructed on the use of  "medications, either prescriptions or OTC and informed on when the appropriate follow up time period should be. In addition, patient was also instructed that should any acute worsening occur that they should notify this clinic asap or call 911.              Review of Systems   Constitutional: Negative.  Negative for chills and fever.   HENT: Negative.  Negative for hearing loss.    Eyes:  Positive for pain. Negative for blurred vision and double vision.   Respiratory: Negative.  Negative for cough and hemoptysis.    Cardiovascular: Negative.  Negative for chest pain and palpitations.   Gastrointestinal: Negative.  Negative for heartburn and nausea.   Genitourinary: Negative.  Negative for dysuria.   Musculoskeletal: Negative.  Negative for myalgias.   Skin:  Positive for itching. Negative for rash.   Neurological: Negative.  Negative for dizziness, tingling and headaches.   Endo/Heme/Allergies: Negative.  Does not bruise/bleed easily.   Psychiatric/Behavioral: Negative.  Negative for depression and suicidal ideas.    All other systems reviewed and are negative.             Objective     /72   Pulse (!) 103   Temp 37 °C (98.6 °F) (Temporal)   Resp 20   Ht 1.651 m (5' 5\")   Wt 99.9 kg (220 lb 3.2 oz)   SpO2 95%   BMI 36.64 kg/m²      Physical Exam  Vitals and nursing note reviewed.   Constitutional:       General: She is not in acute distress.     Appearance: She is well-developed. She is not diaphoretic.   HENT:      Head: Normocephalic and atraumatic.      Right Ear: External ear normal.      Left Ear: External ear normal.      Nose: Nose normal.      Mouth/Throat:      Pharynx: No oropharyngeal exudate.   Eyes:      General: No scleral icterus.        Right eye: No discharge.         Left eye: No discharge.      Pupils: Pupils are equal, round, and reactive to light.   Neck:      Thyroid: No thyromegaly.      Vascular: No JVD.      Trachea: No tracheal deviation.   Cardiovascular:      Rate and Rhythm: " Normal rate and regular rhythm.      Heart sounds: Normal heart sounds. No murmur heard.     No friction rub. No gallop.   Pulmonary:      Effort: Pulmonary effort is normal. No respiratory distress.      Breath sounds: Normal breath sounds. No stridor. No wheezing or rales.   Chest:      Chest wall: No tenderness.   Abdominal:      General: There is no distension.      Palpations: Abdomen is soft.      Tenderness: There is no abdominal tenderness.   Musculoskeletal:      Cervical back: Normal range of motion and neck supple.   Lymphadenopathy:      Cervical: No cervical adenopathy.   Neurological:      Mental Status: She is alert and oriented to person, place, and time.      Cranial Nerves: No cranial nerve deficit.   Psychiatric:         Behavior: Behavior normal.         Thought Content: Thought content normal.         Judgment: Judgment normal.                                  Assessment & Plan  Allergic reaction, initial encounter    Orders:    triamcinolone acetonide (Kenalog-40) injection 40 mg